# Patient Record
Sex: FEMALE | Race: WHITE | Employment: OTHER | URBAN - METROPOLITAN AREA
[De-identification: names, ages, dates, MRNs, and addresses within clinical notes are randomized per-mention and may not be internally consistent; named-entity substitution may affect disease eponyms.]

---

## 2018-02-16 ENCOUNTER — HOSPITAL ENCOUNTER (OUTPATIENT)
Age: 83
Discharge: HOME | End: 2018-02-16
Attending: EMERGENCY MEDICINE
Payer: MEDICARE

## 2018-02-16 VITALS
BODY MASS INDEX: 26.62 KG/M2 | RESPIRATION RATE: 20 BRPM | WEIGHT: 141 LBS | OXYGEN SATURATION: 100 % | TEMPERATURE: 98 F | HEART RATE: 91 BPM | DIASTOLIC BLOOD PRESSURE: 63 MMHG | SYSTOLIC BLOOD PRESSURE: 147 MMHG | HEIGHT: 61 IN

## 2018-02-16 DIAGNOSIS — T78.40XA ACUTE ALLERGIC REACTION, INITIAL ENCOUNTER: Primary | ICD-10-CM

## 2018-02-16 PROCEDURE — 99204 OFFICE O/P NEW MOD 45 MIN: CPT

## 2018-02-16 PROCEDURE — 99203 OFFICE O/P NEW LOW 30 MIN: CPT

## 2018-02-16 RX ORDER — ASPIRIN 81 MG/1
TABLET, CHEWABLE ORAL DAILY
COMMUNITY

## 2018-02-16 RX ORDER — LORATADINE 10 MG/1
10 TABLET ORAL DAILY
Qty: 30 TABLET | Refills: 0 | Status: SHIPPED | OUTPATIENT
Start: 2018-02-16 | End: 2018-03-18

## 2018-02-16 RX ORDER — PREDNISONE 20 MG/1
40 TABLET ORAL DAILY
Qty: 10 TABLET | Refills: 0 | Status: SHIPPED | OUTPATIENT
Start: 2018-02-16 | End: 2018-02-21

## 2018-02-16 NOTE — ED INITIAL ASSESSMENT (HPI)
1/9 pt received Tdap  1/12 dry mouth/ chapped area  1/25-1/30. Isaak Harmongwen White Went to [facility] for 3 days  2/15 symptoms started again  +itching  Redness to face   +swelling

## 2018-02-16 NOTE — ED PROVIDER NOTES
[facility]    History   Patient presents with:   Allergic Rxn Allergies (immune)    Stated Complaint: allergic reaction    HPI    The patient is an 73-year-old female with no significant past medical histo Exam    Constitutional: Well-developed well-nourished in no acute distress  Head: Normocephalic, no swelling or tenderness  Eyes: Nonicteric sclera, no conjunctival injection  ENT: TMs are clear and flat bilaterally.   There is no posterior pharyngeal eryth

## 2018-02-27 ENCOUNTER — HOSPITAL ENCOUNTER (OUTPATIENT)
Age: 83
Discharge: HOME | End: 2018-02-27
Attending: FAMILY MEDICINE
Payer: MEDICARE

## 2018-02-27 VITALS
DIASTOLIC BLOOD PRESSURE: 73 MMHG | HEART RATE: 94 BPM | BODY MASS INDEX: 26.5 KG/M2 | SYSTOLIC BLOOD PRESSURE: 144 MMHG | OXYGEN SATURATION: 98 % | TEMPERATURE: 98 F | HEIGHT: 62 IN | RESPIRATION RATE: 18 BRPM | WEIGHT: 144 LBS

## 2018-02-27 DIAGNOSIS — R21 RASH OF FACE: Primary | ICD-10-CM

## 2018-02-27 PROCEDURE — 99213 OFFICE O/P EST LOW 20 MIN: CPT

## 2018-02-27 PROCEDURE — 99214 OFFICE O/P EST MOD 30 MIN: CPT

## 2018-02-27 RX ORDER — METHYLPREDNISOLONE 4 MG/1
TABLET ORAL
Qty: 1 PACKAGE | Refills: 0 | Status: SHIPPED | OUTPATIENT
Start: 2018-02-27 | End: 2018-03-23

## 2018-02-27 NOTE — ED PROVIDER NOTES
Patient Seen in: [facility]    History   Patient presents with:  Rash Skin Problem (integumentary)    Stated Complaint: RASH    HPI    Patient is here with a facial rash. Started yesterday.   Notes redness and mild tingli normal. Pupils are equal, round, and reactive to light. No scleral icterus. Neck: Normal range of motion. Neck supple. No JVD present. No tracheal deviation present.    Cardiovascular: Normal rate, regular rhythm, normal heart sounds and intact distal pul [place] [id number]    If symptoms worsen        Medications Prescribed:  Discharge Medication List as of 2/27/2018 11:53 AM    START taking these medications    methylPREDNISolone 4 MG Oral Tablet Therapy Pack  Dosepack: use as

## 2018-02-27 NOTE — ED INITIAL ASSESSMENT (HPI)
PATIENT SEEN IN IC FOR EVALUATION OF FACIAL RASH. STATES HER SYMPTOMS STARTED IMPROVING THE NEXT DAY. PATIENT REPORTS RETURN OF FACIAL REDNESS AND ITCHINESS. PATIENT DOES NOT RECALL USE OF NEW DETERGENT/SOAPS/LOTIONS.   STATES SYMPTOMS STARTED WHEN SHE W

## 2018-03-05 PROCEDURE — 86003 ALLG SPEC IGE CRUDE XTRC EA: CPT | Performed by: ALLERGY & IMMUNOLOGY

## 2019-10-04 ENCOUNTER — HOSPITAL ENCOUNTER (OUTPATIENT)
Age: 84
Discharge: HOME | End: 2019-10-04
Attending: EMERGENCY MEDICINE
Payer: MEDICARE

## 2019-10-04 VITALS
RESPIRATION RATE: 20 BRPM | TEMPERATURE: 98 F | HEART RATE: 94 BPM | OXYGEN SATURATION: 97 % | DIASTOLIC BLOOD PRESSURE: 61 MMHG | SYSTOLIC BLOOD PRESSURE: 152 MMHG

## 2019-10-04 DIAGNOSIS — M54.32 SCIATICA OF LEFT SIDE: ICD-10-CM

## 2019-10-04 DIAGNOSIS — S39.012A STRAIN OF LUMBAR REGION, INITIAL ENCOUNTER: Primary | ICD-10-CM

## 2019-10-04 PROCEDURE — 99214 OFFICE O/P EST MOD 30 MIN: CPT

## 2019-10-04 PROCEDURE — 99213 OFFICE O/P EST LOW 20 MIN: CPT

## 2019-10-04 RX ORDER — METHYLPREDNISOLONE 4 MG/1
TABLET ORAL
Qty: 1 PACKAGE | Refills: 0 | Status: SHIPPED | OUTPATIENT
Start: 2019-10-04 | End: 2019-12-09

## 2019-10-04 NOTE — ED INITIAL ASSESSMENT (HPI)
Left lower back pain radiating into left buttock and down left leg starting yesterday after reaching over for mail. Some tingling sensation to LLE.

## 2019-10-04 NOTE — ED PROVIDER NOTES
Patient Seen in: [facility]      History   Patient presents with:  Back Pain (musculoskeletal)    Stated Complaint: LOWER BACK PAIN    HPI    The patient is an 51-year-old female with no significant past medical history p distress  Head: Normocephalic, no swelling or tenderness  Eyes: Nonicteric sclera, no conjunctival injection  ENT: TMs are clear and flat bilaterally.   There is no posterior pharyngeal erythema  Chest: Clear to auscultation, no tenderness  Cardiovascular:

## 2022-11-01 ENCOUNTER — EKG ENCOUNTER (OUTPATIENT)
Dept: LAB | Age: 87
End: 2022-11-01
Attending: FAMILY MEDICINE
Payer: MEDICARE

## 2022-11-01 ENCOUNTER — OFFICE VISIT (OUTPATIENT)
Dept: FAMILY MEDICINE CLINIC | Facility: CLINIC | Age: 87
End: 2022-11-01
Payer: MEDICARE

## 2022-11-01 ENCOUNTER — HOSPITAL ENCOUNTER (OUTPATIENT)
Dept: GENERAL RADIOLOGY | Age: 87
Discharge: HOME | End: 2022-11-01
Attending: FAMILY MEDICINE
Payer: MEDICARE

## 2022-11-01 ENCOUNTER — LAB ENCOUNTER (OUTPATIENT)
Dept: LAB | Age: 87
End: 2022-11-01
Attending: FAMILY MEDICINE
Payer: MEDICARE

## 2022-11-01 VITALS
DIASTOLIC BLOOD PRESSURE: 84 MMHG | HEART RATE: 83 BPM | SYSTOLIC BLOOD PRESSURE: 158 MMHG | WEIGHT: 173 LBS | HEIGHT: 60 IN | BODY MASS INDEX: 33.96 KG/M2 | RESPIRATION RATE: 16 BRPM

## 2022-11-01 DIAGNOSIS — M54.50 CHRONIC BILATERAL LOW BACK PAIN WITHOUT SCIATICA: ICD-10-CM

## 2022-11-01 DIAGNOSIS — Z13.29 SCREENING FOR THYROID DISORDER: ICD-10-CM

## 2022-11-01 DIAGNOSIS — Z12.31 ENCOUNTER FOR SCREENING MAMMOGRAM FOR MALIGNANT NEOPLASM OF BREAST: ICD-10-CM

## 2022-11-01 DIAGNOSIS — Z00.00 ENCOUNTER FOR ANNUAL HEALTH EXAMINATION: Primary | ICD-10-CM

## 2022-11-01 DIAGNOSIS — Z13.0 SCREENING, ANEMIA, DEFICIENCY, IRON: ICD-10-CM

## 2022-11-01 DIAGNOSIS — R03.0 ELEVATED BLOOD PRESSURE READING WITHOUT DIAGNOSIS OF HYPERTENSION: ICD-10-CM

## 2022-11-01 DIAGNOSIS — G89.29 CHRONIC BILATERAL LOW BACK PAIN WITHOUT SCIATICA: ICD-10-CM

## 2022-11-01 DIAGNOSIS — Z78.0 POSTMENOPAUSAL: ICD-10-CM

## 2022-11-01 DIAGNOSIS — R20.2 PARESTHESIA OF BOTH HANDS: ICD-10-CM

## 2022-11-01 DIAGNOSIS — R06.09 DYSPNEA ON EXERTION: ICD-10-CM

## 2022-11-01 DIAGNOSIS — Z13.6 SCREENING FOR CARDIOVASCULAR CONDITION: ICD-10-CM

## 2022-11-01 DIAGNOSIS — Z23 NEED FOR ZOSTER VACCINATION: ICD-10-CM

## 2022-11-01 DIAGNOSIS — Z23 NEED FOR VACCINATION: ICD-10-CM

## 2022-11-01 DIAGNOSIS — K21.9 GASTROESOPHAGEAL REFLUX DISEASE WITHOUT ESOPHAGITIS: ICD-10-CM

## 2022-11-01 PROBLEM — M54.32 SCIATIC PAIN, LEFT: Status: ACTIVE | Noted: 2022-11-01

## 2022-11-01 LAB
ALBUMIN SERPL-MCNC: 3.7 G/DL (ref 3.4–5)
ALBUMIN/GLOB SERPL: 0.8 {RATIO} (ref 1–2)
ALP LIVER SERPL-CCNC: 55 U/L
ALT SERPL-CCNC: 30 U/L
ANION GAP SERPL CALC-SCNC: 6 MMOL/L (ref 0–18)
AST SERPL-CCNC: 21 U/L (ref 15–37)
BASOPHILS # BLD AUTO: 0.09 X10(3) UL (ref 0–0.2)
BASOPHILS NFR BLD AUTO: 1 %
BILIRUB SERPL-MCNC: 0.7 MG/DL (ref 0.1–2)
BUN BLD-MCNC: 19 MG/DL (ref 7–18)
BUN/CREAT SERPL: 17.3 (ref 10–20)
CALCIUM BLD-MCNC: 9.9 MG/DL (ref 8.5–10.1)
CHLORIDE SERPL-SCNC: 106 MMOL/L (ref 98–112)
CHOLEST SERPL-MCNC: 154 MG/DL (ref ?–200)
CO2 SERPL-SCNC: 26 MMOL/L (ref 21–32)
CREAT BLD-MCNC: 1.1 MG/DL
DEPRECATED RDW RBC AUTO: 48.3 FL (ref 35.1–46.3)
EOSINOPHIL # BLD AUTO: 0.15 X10(3) UL (ref 0–0.7)
EOSINOPHIL NFR BLD AUTO: 1.6 %
ERYTHROCYTE [DISTWIDTH] IN BLOOD BY AUTOMATED COUNT: 12.7 % (ref 11–15)
FASTING PATIENT LIPID ANSWER: NO
FASTING STATUS PATIENT QL REPORTED: NO
FOLATE SERPL-MCNC: >20 NG/ML (ref 8.7–?)
GFR SERPLBLD BASED ON 1.73 SQ M-ARVRAT: 50 ML/MIN/1.73M2 (ref 60–?)
GLOBULIN PLAS-MCNC: 4.6 G/DL (ref 2.8–4.4)
GLUCOSE BLD-MCNC: 108 MG/DL (ref 70–99)
HCT VFR BLD AUTO: 45.8 %
HDLC SERPL-MCNC: 69 MG/DL (ref 40–59)
HGB BLD-MCNC: 14.3 G/DL
IMM GRANULOCYTES # BLD AUTO: 0.01 X10(3) UL (ref 0–1)
IMM GRANULOCYTES NFR BLD: 0.1 %
LDLC SERPL CALC-MCNC: 66 MG/DL (ref ?–100)
LYMPHOCYTES # BLD AUTO: 2.26 X10(3) UL (ref 1–4)
LYMPHOCYTES NFR BLD AUTO: 24.7 %
MCH RBC QN AUTO: 31.9 PG (ref 26–34)
MCHC RBC AUTO-ENTMCNC: 31.2 G/DL (ref 31–37)
MCV RBC AUTO: 102.2 FL
MONOCYTES # BLD AUTO: 0.91 X10(3) UL (ref 0.1–1)
MONOCYTES NFR BLD AUTO: 9.9 %
NEUTROPHILS # BLD AUTO: 5.74 X10 (3) UL (ref 1.5–7.7)
NEUTROPHILS # BLD AUTO: 5.74 X10(3) UL (ref 1.5–7.7)
NEUTROPHILS NFR BLD AUTO: 62.7 %
NONHDLC SERPL-MCNC: 85 MG/DL (ref ?–130)
OSMOLALITY SERPL CALC.SUM OF ELEC: 289 MOSM/KG (ref 275–295)
PLATELET # BLD AUTO: 240 10(3)UL (ref 150–450)
POTASSIUM SERPL-SCNC: 4 MMOL/L (ref 3.5–5.1)
PROT SERPL-MCNC: 8.3 G/DL (ref 6.4–8.2)
RBC # BLD AUTO: 4.48 X10(6)UL
SODIUM SERPL-SCNC: 138 MMOL/L (ref 136–145)
TRIGL SERPL-MCNC: 105 MG/DL (ref 30–149)
TSI SER-ACNC: 1.96 MIU/ML (ref 0.36–3.74)
VIT B12 SERPL-MCNC: 559 PG/ML (ref 193–986)
VLDLC SERPL CALC-MCNC: 16 MG/DL (ref 0–30)
WBC # BLD AUTO: 9.2 X10(3) UL (ref 4–11)

## 2022-11-01 PROCEDURE — 72050 X-RAY EXAM NECK SPINE 4/5VWS: CPT | Performed by: FAMILY MEDICINE

## 2022-11-01 PROCEDURE — 85025 COMPLETE CBC W/AUTO DIFF WBC: CPT

## 2022-11-01 PROCEDURE — G0009 ADMIN PNEUMOCOCCAL VACCINE: HCPCS | Performed by: FAMILY MEDICINE

## 2022-11-01 PROCEDURE — 93005 ELECTROCARDIOGRAM TRACING: CPT

## 2022-11-01 PROCEDURE — 90677 PCV20 VACCINE IM: CPT | Performed by: FAMILY MEDICINE

## 2022-11-01 PROCEDURE — 80061 LIPID PANEL: CPT

## 2022-11-01 PROCEDURE — 99204 OFFICE O/P NEW MOD 45 MIN: CPT | Performed by: FAMILY MEDICINE

## 2022-11-01 PROCEDURE — 36415 COLL VENOUS BLD VENIPUNCTURE: CPT

## 2022-11-01 PROCEDURE — 80053 COMPREHEN METABOLIC PANEL: CPT

## 2022-11-01 PROCEDURE — 93010 ELECTROCARDIOGRAM REPORT: CPT | Performed by: FAMILY MEDICINE

## 2022-11-01 PROCEDURE — 84443 ASSAY THYROID STIM HORMONE: CPT

## 2022-11-01 PROCEDURE — 72110 X-RAY EXAM L-2 SPINE 4/>VWS: CPT | Performed by: FAMILY MEDICINE

## 2022-11-01 PROCEDURE — 82607 VITAMIN B-12: CPT

## 2022-11-01 PROCEDURE — G0439 PPPS, SUBSEQ VISIT: HCPCS | Performed by: FAMILY MEDICINE

## 2022-11-01 PROCEDURE — 82746 ASSAY OF FOLIC ACID SERUM: CPT

## 2022-11-01 RX ORDER — VITS A,C,E/LUTEIN/MINERALS 300MCG-200
1 TABLET ORAL
COMMUNITY

## 2022-11-01 RX ORDER — OMEPRAZOLE 20 MG/1
20 CAPSULE, DELAYED RELEASE ORAL
Qty: 30 CAPSULE | Refills: 3 | Status: SHIPPED | OUTPATIENT
Start: 2022-11-01

## 2022-11-01 RX ORDER — ZOSTER VACCINE RECOMBINANT, ADJUVANTED 50 MCG/0.5
50 KIT INTRAMUSCULAR ONCE
Qty: 1 EACH | Refills: 1 | Status: SHIPPED | OUTPATIENT
Start: 2022-11-01 | End: 2022-11-01

## 2022-11-02 DIAGNOSIS — M47.816 SPONDYLOSIS OF LUMBAR REGION WITHOUT MYELOPATHY OR RADICULOPATHY: ICD-10-CM

## 2022-11-02 DIAGNOSIS — M47.812 OSTEOARTHRITIS OF CERVICAL SPINE, UNSPECIFIED SPINAL OSTEOARTHRITIS COMPLICATION STATUS: Primary | ICD-10-CM

## 2022-11-02 DIAGNOSIS — R73.09 ELEVATED GLUCOSE: ICD-10-CM

## 2022-11-10 ENCOUNTER — PATIENT MESSAGE (OUTPATIENT)
Dept: FAMILY MEDICINE CLINIC | Facility: CLINIC | Age: 87
End: 2022-11-10

## 2022-11-11 RX ORDER — LOSARTAN POTASSIUM 25 MG/1
25 TABLET ORAL DAILY
Qty: 30 TABLET | Refills: 0 | Status: SHIPPED | OUTPATIENT
Start: 2022-11-11

## 2022-11-11 NOTE — TELEPHONE ENCOUNTER
Dr Ivan Way =see below and advice,thanks. Future Appointments   Date Time Provider Nadege Delatorrei   11/16/2022  2:00 PM  [address]   11/30/2022  1:30 PM Ade Mayers MD PM&R [facility]   12/5/2022  2:40 PM L[facility] KOBY RM1 LMB KOBY EM [place]   12/8/2022  2:20 PM LMB DEXA RM1 LMB DEXA EM Abdias Marsh RN 11/10/2022  6:21 PM CST        ----- Message -----  From: Albert Thompson  Sent: 11/10/2022   4:36 PM CST  To: Em Rn Triage  Subject: blood pressure                                   Dr. Gabo Moreno told me to let her know if my blood pressure did not go down. it ranges from 142/88 to 163/97. I go for the stress test 11/16. Do you want me to do something before that? I will keep taking my blood pressure twice a day.

## 2022-11-16 ENCOUNTER — HOSPITAL ENCOUNTER (OUTPATIENT)
Dept: CV DIAGNOSTICS | Facility: HOSPITAL | Age: 87
Discharge: HOME | End: 2022-11-16
Attending: FAMILY MEDICINE
Payer: MEDICARE

## 2022-11-16 DIAGNOSIS — R06.09 DYSPNEA ON EXERTION: ICD-10-CM

## 2022-11-16 LAB
% OF MAX PREDICTED HR: 100 %
MAX DIASTOLIC BP: 70 MMHG
MAX HEART RATE: 137 BPM
MAX PREDICTED HEART RATE: 135 BPM
MAX SYSTOLIC BP: 200 MMHG
MAX WORK LOAD: 46

## 2022-11-16 PROCEDURE — 93350 STRESS TTE ONLY: CPT | Performed by: FAMILY MEDICINE

## 2022-11-16 PROCEDURE — 93018 CV STRESS TEST I&R ONLY: CPT | Performed by: INTERNAL MEDICINE

## 2022-11-16 PROCEDURE — 93016 CV STRESS TEST SUPVJ ONLY: CPT | Performed by: INTERNAL MEDICINE

## 2022-11-16 PROCEDURE — 93017 CV STRESS TEST TRACING ONLY: CPT | Performed by: FAMILY MEDICINE

## 2022-11-17 ENCOUNTER — PATIENT MESSAGE (OUTPATIENT)
Dept: FAMILY MEDICINE CLINIC | Facility: CLINIC | Age: 87
End: 2022-11-17

## 2022-11-17 DIAGNOSIS — R94.39 ABNORMAL STRESS TEST: Primary | ICD-10-CM

## 2022-11-17 RX ORDER — ASPIRIN 81 MG/1
81 TABLET ORAL DAILY
Qty: 30 TABLET | Refills: 1 | Status: SHIPPED | OUTPATIENT
Start: 2022-11-17

## 2022-11-17 RX ORDER — ATORVASTATIN CALCIUM 10 MG/1
10 TABLET, FILM COATED ORAL NIGHTLY
Qty: 90 TABLET | Refills: 3 | Status: SHIPPED | OUTPATIENT
Start: 2022-11-17

## 2022-11-30 ENCOUNTER — OFFICE VISIT (OUTPATIENT)
Dept: PHYSICAL MEDICINE AND REHAB | Facility: CLINIC | Age: 87
End: 2022-11-30
Payer: MEDICARE

## 2022-11-30 VITALS
SYSTOLIC BLOOD PRESSURE: 118 MMHG | BODY MASS INDEX: 31.91 KG/M2 | WEIGHT: 169 LBS | DIASTOLIC BLOOD PRESSURE: 84 MMHG | HEIGHT: 61 IN

## 2022-11-30 DIAGNOSIS — K21.9 GASTROESOPHAGEAL REFLUX DISEASE WITHOUT ESOPHAGITIS: ICD-10-CM

## 2022-11-30 DIAGNOSIS — M47.816 LUMBAR SPONDYLOSIS: Primary | ICD-10-CM

## 2022-11-30 PROCEDURE — 99204 OFFICE O/P NEW MOD 45 MIN: CPT | Performed by: PHYSICAL MEDICINE & REHABILITATION

## 2022-11-30 RX ORDER — METHYLPREDNISOLONE 4 MG/1
TABLET ORAL
Qty: 1 EACH | Refills: 0 | Status: SHIPPED | OUTPATIENT
Start: 2022-11-30

## 2022-12-01 ENCOUNTER — MED REC SCAN ONLY (OUTPATIENT)
Dept: PHYSICAL MEDICINE AND REHAB | Facility: CLINIC | Age: 87
End: 2022-12-01

## 2022-12-05 ENCOUNTER — HOSPITAL ENCOUNTER (OUTPATIENT)
Dept: MAMMOGRAPHY | Age: 87
Discharge: HOME | End: 2022-12-05
Attending: FAMILY MEDICINE
Payer: MEDICARE

## 2022-12-05 DIAGNOSIS — Z12.31 ENCOUNTER FOR SCREENING MAMMOGRAM FOR MALIGNANT NEOPLASM OF BREAST: ICD-10-CM

## 2022-12-05 PROCEDURE — 77067 SCR MAMMO BI INCL CAD: CPT | Performed by: FAMILY MEDICINE

## 2022-12-05 PROCEDURE — 77063 BREAST TOMOSYNTHESIS BI: CPT | Performed by: FAMILY MEDICINE

## 2022-12-13 ENCOUNTER — HOSPITAL ENCOUNTER (OUTPATIENT)
Dept: BONE DENSITY | Age: 87
Discharge: HOME | End: 2022-12-13
Attending: FAMILY MEDICINE
Payer: MEDICARE

## 2022-12-13 DIAGNOSIS — Z78.0 POSTMENOPAUSAL: ICD-10-CM

## 2022-12-13 PROCEDURE — 77080 DXA BONE DENSITY AXIAL: CPT | Performed by: FAMILY MEDICINE

## 2023-01-18 ENCOUNTER — MED REC SCAN ONLY (OUTPATIENT)
Dept: PHYSICAL MEDICINE AND REHAB | Facility: CLINIC | Age: 88
End: 2023-01-18

## 2023-02-27 ENCOUNTER — MED REC SCAN ONLY (OUTPATIENT)
Dept: PHYSICAL MEDICINE AND REHAB | Facility: CLINIC | Age: 88
End: 2023-02-27

## 2023-11-06 ENCOUNTER — OFFICE VISIT (OUTPATIENT)
Dept: FAMILY MEDICINE CLINIC | Facility: CLINIC | Age: 88
End: 2023-11-06
Payer: MEDICARE

## 2023-11-06 VITALS
HEIGHT: 61 IN | BODY MASS INDEX: 31.93 KG/M2 | WEIGHT: 169.13 LBS | SYSTOLIC BLOOD PRESSURE: 125 MMHG | RESPIRATION RATE: 17 BRPM | DIASTOLIC BLOOD PRESSURE: 75 MMHG | HEART RATE: 86 BPM

## 2023-11-06 DIAGNOSIS — I10 ESSENTIAL HYPERTENSION: ICD-10-CM

## 2023-11-06 DIAGNOSIS — Z00.00 ENCOUNTER FOR ANNUAL HEALTH EXAMINATION: Primary | ICD-10-CM

## 2023-11-06 DIAGNOSIS — G89.29 CHRONIC BILATERAL LOW BACK PAIN WITHOUT SCIATICA: ICD-10-CM

## 2023-11-06 DIAGNOSIS — E78.5 DYSLIPIDEMIA: ICD-10-CM

## 2023-11-06 DIAGNOSIS — Z23 NEEDS FLU SHOT: ICD-10-CM

## 2023-11-06 DIAGNOSIS — E55.9 VITAMIN D INSUFFICIENCY: ICD-10-CM

## 2023-11-06 DIAGNOSIS — M54.50 CHRONIC BILATERAL LOW BACK PAIN WITHOUT SCIATICA: ICD-10-CM

## 2023-11-16 ENCOUNTER — LAB ENCOUNTER (OUTPATIENT)
Dept: LAB | Age: 88
End: 2023-11-16
Attending: FAMILY MEDICINE
Payer: MEDICARE

## 2023-11-16 DIAGNOSIS — I10 ESSENTIAL HYPERTENSION: ICD-10-CM

## 2023-11-16 DIAGNOSIS — E78.5 DYSLIPIDEMIA: ICD-10-CM

## 2023-11-16 DIAGNOSIS — E55.9 VITAMIN D INSUFFICIENCY: ICD-10-CM

## 2023-11-16 LAB
ALBUMIN SERPL-MCNC: 4.3 G/DL (ref 3.2–4.8)
ALBUMIN/GLOB SERPL: 1.2 {RATIO} (ref 1–2)
ALP LIVER SERPL-CCNC: 57 U/L
ALT SERPL-CCNC: 15 U/L
ANION GAP SERPL CALC-SCNC: 10 MMOL/L (ref 0–18)
AST SERPL-CCNC: 18 U/L (ref ?–34)
BILIRUB SERPL-MCNC: 0.4 MG/DL (ref 0.2–1.1)
BUN BLD-MCNC: 18 MG/DL (ref 9–23)
BUN/CREAT SERPL: 17.8 (ref 10–20)
CALCIUM BLD-MCNC: 9.4 MG/DL (ref 8.7–10.4)
CHLORIDE SERPL-SCNC: 108 MMOL/L (ref 98–112)
CHOLEST SERPL-MCNC: 185 MG/DL (ref ?–200)
CO2 SERPL-SCNC: 25 MMOL/L (ref 21–32)
CREAT BLD-MCNC: 1.01 MG/DL
EGFRCR SERPLBLD CKD-EPI 2021: 54 ML/MIN/1.73M2 (ref 60–?)
FASTING PATIENT LIPID ANSWER: NO
FASTING STATUS PATIENT QL REPORTED: NO
GLOBULIN PLAS-MCNC: 3.5 G/DL (ref 2.8–4.4)
GLUCOSE BLD-MCNC: 146 MG/DL (ref 70–99)
HDLC SERPL-MCNC: 63 MG/DL (ref 40–59)
LDLC SERPL CALC-MCNC: 84 MG/DL (ref ?–100)
NONHDLC SERPL-MCNC: 122 MG/DL (ref ?–130)
OSMOLALITY SERPL CALC.SUM OF ELEC: 301 MOSM/KG (ref 275–295)
POTASSIUM SERPL-SCNC: 4.1 MMOL/L (ref 3.5–5.1)
PROT SERPL-MCNC: 7.8 G/DL (ref 5.7–8.2)
SODIUM SERPL-SCNC: 143 MMOL/L (ref 136–145)
TRIGL SERPL-MCNC: 230 MG/DL (ref 30–149)
VLDLC SERPL CALC-MCNC: 37 MG/DL (ref 0–30)

## 2023-11-16 PROCEDURE — 36415 COLL VENOUS BLD VENIPUNCTURE: CPT

## 2023-11-16 PROCEDURE — 82306 VITAMIN D 25 HYDROXY: CPT

## 2023-11-16 PROCEDURE — 80061 LIPID PANEL: CPT

## 2023-11-16 PROCEDURE — 80053 COMPREHEN METABOLIC PANEL: CPT

## 2023-11-17 DIAGNOSIS — R73.09 ELEVATED GLUCOSE: Primary | ICD-10-CM

## 2023-11-17 LAB — VIT D+METAB SERPL-MCNC: 42.5 NG/ML (ref 30–100)

## 2024-07-22 DIAGNOSIS — K21.9 GASTROESOPHAGEAL REFLUX DISEASE WITHOUT ESOPHAGITIS: ICD-10-CM

## 2024-07-25 RX ORDER — OMEPRAZOLE 20 MG/1
20 CAPSULE, DELAYED RELEASE ORAL
Qty: 90 CAPSULE | Refills: 0 | Status: SHIPPED | OUTPATIENT
Start: 2024-07-25

## 2024-07-25 NOTE — TELEPHONE ENCOUNTER
Giving 90 courtesy fill, needs to establish care with Dr. Johnson or other provider    Please call to make appointment     Refill passed per [facility] protocols.  Requested Prescriptions   Pending Prescriptions Disp Refills    OMEPRAZOLE 20 MG Oral Capsule Delayed Release [Pharmacy Med Name: Omeprazole Dr 20 Mg Cap Nort] 90 capsule 0     Sig: TAKE ONE CAPSULE BY MOUTH BEFORE BREAKFAST       Gastrointestional Medication Protocol Passed - 7/22/2024  3:44 PM        Passed - In person appointment or virtual visit in the past 12 mos or appointment in next 3 mos     Recent Outpatient Visits              8 months ago Encounter for annual health examination    [facility], [address], [place] Ariza Virginia Saxena MD    Office Visit    1 year ago Lumbar spondylosis    [facility], Evan Moody MD    Office Visit    1 year ago Encounter for annual health examination    [facility], [address], [place] Virginia Lima MD    Office Visit    4 years ago Acute left-sided low back pain with left-sided sciatica    Internal Medicine - [address], [place] Huertas-Rivera, Beatriz, MD    Office Visit    6 years ago Allergic contact dermatitis, unspecified trigger    Dermatology - [place] Kit Lee Ainah, MD    Office Visit

## 2024-10-21 ENCOUNTER — HOSPITAL ENCOUNTER (OUTPATIENT)
Dept: GENERAL RADIOLOGY | Age: 89
Discharge: HOME | End: 2024-10-21
Attending: INTERNAL MEDICINE
Payer: MEDICARE

## 2024-10-21 ENCOUNTER — OFFICE VISIT (OUTPATIENT)
Dept: INTERNAL MEDICINE CLINIC | Facility: CLINIC | Age: 89
End: 2024-10-21
Payer: MEDICARE

## 2024-10-21 VITALS
DIASTOLIC BLOOD PRESSURE: 81 MMHG | HEART RATE: 83 BPM | SYSTOLIC BLOOD PRESSURE: 131 MMHG | WEIGHT: 179 LBS | HEIGHT: 60.43 IN | RESPIRATION RATE: 18 BRPM | BODY MASS INDEX: 34.68 KG/M2

## 2024-10-21 DIAGNOSIS — G25.81 RESTLESS LEG SYNDROME: ICD-10-CM

## 2024-10-21 DIAGNOSIS — M25.551 PAIN OF RIGHT HIP: Primary | ICD-10-CM

## 2024-10-21 DIAGNOSIS — Z12.83 SKIN EXAM, SCREENING FOR CANCER: ICD-10-CM

## 2024-10-21 DIAGNOSIS — E61.1 IRON DEFICIENCY: ICD-10-CM

## 2024-10-21 DIAGNOSIS — M25.551 PAIN OF RIGHT HIP: ICD-10-CM

## 2024-10-21 DIAGNOSIS — K21.9 GASTROESOPHAGEAL REFLUX DISEASE WITHOUT ESOPHAGITIS: ICD-10-CM

## 2024-10-21 DIAGNOSIS — Z00.00 ANNUAL PHYSICAL EXAM: ICD-10-CM

## 2024-10-21 PROCEDURE — G0008 ADMIN INFLUENZA VIRUS VAC: HCPCS | Performed by: INTERNAL MEDICINE

## 2024-10-21 PROCEDURE — 90662 IIV NO PRSV INCREASED AG IM: CPT | Performed by: INTERNAL MEDICINE

## 2024-10-21 PROCEDURE — 73502 X-RAY EXAM HIP UNI 2-3 VIEWS: CPT | Performed by: INTERNAL MEDICINE

## 2024-10-21 PROCEDURE — 99214 OFFICE O/P EST MOD 30 MIN: CPT | Performed by: INTERNAL MEDICINE

## 2024-10-21 RX ORDER — LOSARTAN POTASSIUM 25 MG/1
25 TABLET ORAL DAILY
Qty: 90 TABLET | Refills: 3 | Status: SHIPPED | OUTPATIENT
Start: 2024-10-21

## 2024-10-21 NOTE — PROGRESS NOTES
Kendra Polanco is a 88 year old female.  Chief Complaint   Patient presents with    Establish Care     HPI:    Patient presented today for establishment of care. She states that she has been having pain in R hip mostly in the front and sides of the hip. Able to ambulate but with limitations.    Wants to see a derm for a full skin check, noticed a couple big nevus.          Current Outpatient Medications   Medication Sig Dispense Refill    losartan 25 MG Oral Tab Take 1 tablet (25 mg total) by mouth daily. 90 tablet 3    omeprazole 20 MG Oral Capsule Delayed Release Take 1 capsule (20 mg total) by mouth before breakfast. 90 capsule 3    multivitamin Oral Tab Take 1 tablet by mouth daily with breakfast.      Cholecalciferol (VITAMIN D) 2000 units Oral Cap Take 1 capsule (2,000 Units total) by mouth daily.      traMADol 50 MG Oral Tab Take 1 tablet (50 mg total) by mouth nightly as needed for Pain. 15 tablet 0      History reviewed. No pertinent past medical history.   Past Surgical History:   Procedure Laterality Date    Oophorectomy Bilateral     Cyst    Removal gallbladder      Total abdom hysterectomy      Fibromas      Social History:  Social History     Socioeconomic History    Marital status:    Tobacco Use    Smoking status: Former     Current packs/day: 0.00     Types: Cigarettes     Quit date:      Years since quittin.8    Smokeless tobacco: Never    Tobacco comments:     quit 2 yrs ago   Vaping Use    Vaping status: Never Used   Substance and Sexual Activity    Alcohol use: Yes     Alcohol/week: 0.0 standard drinks of alcohol     Comment: very rarely on social occassion    Drug use: No    Sexual activity: Never      History reviewed. No pertinent family history.   Allergies[1]     REVIEW OF SYSTEMS:   Review of Systems   Review of Systems   Constitutional: Negative for activity change, appetite change and fever.   HENT: Negative for congestion and voice change.    Respiratory: Negative for cough  and shortness of breath.    Cardiovascular: Negative for chest pain.   Gastrointestinal: Negative for abdominal distention, abdominal pain and vomiting.   Genitourinary: Negative for hematuria.   Skin: Negative for wound.   Psychiatric/Behavioral: Negative for behavioral problems.   Wt Readings from Last 5 Encounters:   10/21/24 179 lb (81.2 kg)   11/06/23 169 lb 1.6 oz (76.7 kg)   11/30/22 169 lb (76.7 kg)   11/01/22 173 lb (78.5 kg)   12/09/19 162 lb (73.5 kg)     Body mass index is 34.46 kg/m².      EXAM:   /81   Pulse 83   Resp 18   Ht 5' 0.43\" (1.535 m)   Wt 179 lb (81.2 kg)   BMI 34.46 kg/m²   Physical Exam   Constitutional:       Appearance: Normal appearance.   HENT:      Head: Normocephalic.   Eyes:      Conjunctiva/sclera: Conjunctivae normal.   Cardiovascular:      Rate and Rhythm: Normal rate and regular rhythm.      Heart sounds: Normal heart sounds. No murmur heard.  Pulmonary:      Effort: Pulmonary effort is normal.      Breath sounds: Normal breath sounds. No rhonchi or rales.   Abdominal:      General: Bowel sounds are normal.      Palpations: Abdomen is soft.      Tenderness: There is no abdominal tenderness.   Musculoskeletal:      Cervical back: Neck supple.      Right lower leg: No edema.      Left lower leg: No edema.   Skin:     General: Skin is warm and dry.   Neurological:      General: No focal deficit present.      Mental Status: He is alert and oriented to person, place, and time. Mental status is at baseline.   Psychiatric:         Mood and Affect: Mood normal.         Behavior: Behavior normal.       ASSESSMENT AND PLAN:   1. Pain of right hip  - tramadol as needed for pain   - avoid heavy lifting   - XR HIP W OR WO PELVIS 2 OR 3 VIEWS, RIGHT (CPT=73502) [[id number]] - Orthopedic providers only; Future    2. Skin exam, screening for cancer  - Derm Referral - In Network    3. Annual physical exam  - return for annual physical  - complete blood work prior  - Comp Metabolic Panel  (14); Future  - Lipid Panel; Future  - TSH W Reflex To Free T4; Future  - CBC With Differential With Platelet; Future    4. Restless leg syndrome  - check iron levels  - Iron And Tibc [E]; Future  - Ferritin [E]; Future    5. Iron deficiency  - Iron And Tibc [E]; Future  - Ferritin [E]; Future    6. Gastroesophageal reflux disease without esophagitis  - omeprazole 20 MG Oral Capsule Delayed Release; Take 1 capsule (20 mg total) by mouth before breakfast.  Dispense: 90 capsule; Refill: 3      The patient indicates understanding of these issues and agrees to the plan.  Return for physical     Zaira Johnson MD          [1]   Allergies  Allergen Reactions    Morphine NAUSEA AND VOMITING    Ink RASH

## 2024-10-23 ENCOUNTER — PATIENT MESSAGE (OUTPATIENT)
Dept: INTERNAL MEDICINE CLINIC | Facility: CLINIC | Age: 89
End: 2024-10-23

## 2024-10-23 DIAGNOSIS — M25.551 PAIN OF RIGHT HIP: Primary | ICD-10-CM

## 2024-10-24 RX ORDER — TRAMADOL HYDROCHLORIDE 50 MG/1
50 TABLET ORAL NIGHTLY PRN
Qty: 15 TABLET | Refills: 0 | Status: SHIPPED | OUTPATIENT
Start: 2024-10-24

## 2024-11-13 ENCOUNTER — LAB ENCOUNTER (OUTPATIENT)
Dept: LAB | Age: 89
End: 2024-11-13
Attending: INTERNAL MEDICINE
Payer: MEDICARE

## 2024-11-13 DIAGNOSIS — R73.09 ELEVATED GLUCOSE: ICD-10-CM

## 2024-11-13 DIAGNOSIS — G25.81 RESTLESS LEG SYNDROME: ICD-10-CM

## 2024-11-13 DIAGNOSIS — E61.1 IRON DEFICIENCY: ICD-10-CM

## 2024-11-13 DIAGNOSIS — Z00.00 ANNUAL PHYSICAL EXAM: ICD-10-CM

## 2024-11-13 LAB
ALBUMIN SERPL-MCNC: 4.6 G/DL (ref 3.2–4.8)
ALBUMIN/GLOB SERPL: 1.4 {RATIO} (ref 1–2)
ALP LIVER SERPL-CCNC: 66 U/L
ALT SERPL-CCNC: 18 U/L
ANION GAP SERPL CALC-SCNC: 9 MMOL/L (ref 0–18)
AST SERPL-CCNC: 20 U/L (ref ?–34)
BASOPHILS # BLD AUTO: 0.08 X10(3) UL (ref 0–0.2)
BASOPHILS NFR BLD AUTO: 1 %
BILIRUB SERPL-MCNC: 0.8 MG/DL (ref 0.2–1.1)
BUN BLD-MCNC: 19 MG/DL (ref 9–23)
BUN/CREAT SERPL: 17.6 (ref 10–20)
CALCIUM BLD-MCNC: 9.8 MG/DL (ref 8.7–10.4)
CHLORIDE SERPL-SCNC: 109 MMOL/L (ref 98–112)
CHOLEST SERPL-MCNC: 172 MG/DL (ref ?–200)
CO2 SERPL-SCNC: 26 MMOL/L (ref 21–32)
CREAT BLD-MCNC: 1.08 MG/DL
DEPRECATED HBV CORE AB SER IA-ACNC: 77 NG/ML
DEPRECATED RDW RBC AUTO: 46.1 FL (ref 35.1–46.3)
EGFRCR SERPLBLD CKD-EPI 2021: 50 ML/MIN/1.73M2 (ref 60–?)
EOSINOPHIL # BLD AUTO: 0.21 X10(3) UL (ref 0–0.7)
EOSINOPHIL NFR BLD AUTO: 2.7 %
ERYTHROCYTE [DISTWIDTH] IN BLOOD BY AUTOMATED COUNT: 12.5 % (ref 11–15)
EST. AVERAGE GLUCOSE BLD GHB EST-MCNC: 140 MG/DL (ref 68–126)
FASTING PATIENT LIPID ANSWER: YES
FASTING STATUS PATIENT QL REPORTED: YES
GLOBULIN PLAS-MCNC: 3.3 G/DL (ref 2–3.5)
GLUCOSE BLD-MCNC: 131 MG/DL (ref 70–99)
HBA1C MFR BLD: 6.5 % (ref ?–5.7)
HCT VFR BLD AUTO: 40.1 %
HDLC SERPL-MCNC: 58 MG/DL (ref 40–59)
HGB BLD-MCNC: 13 G/DL
IMM GRANULOCYTES # BLD AUTO: 0.02 X10(3) UL (ref 0–1)
IMM GRANULOCYTES NFR BLD: 0.3 %
IRON SATN MFR SERPL: 28 %
IRON SERPL-MCNC: 103 UG/DL
LDLC SERPL CALC-MCNC: 97 MG/DL (ref ?–100)
LYMPHOCYTES # BLD AUTO: 1.97 X10(3) UL (ref 1–4)
LYMPHOCYTES NFR BLD AUTO: 24.9 %
MCH RBC QN AUTO: 32.3 PG (ref 26–34)
MCHC RBC AUTO-ENTMCNC: 32.4 G/DL (ref 31–37)
MCV RBC AUTO: 99.5 FL
MONOCYTES # BLD AUTO: 0.61 X10(3) UL (ref 0.1–1)
MONOCYTES NFR BLD AUTO: 7.7 %
NEUTROPHILS # BLD AUTO: 5.03 X10 (3) UL (ref 1.5–7.7)
NEUTROPHILS # BLD AUTO: 5.03 X10(3) UL (ref 1.5–7.7)
NEUTROPHILS NFR BLD AUTO: 63.4 %
NONHDLC SERPL-MCNC: 114 MG/DL (ref ?–130)
OSMOLALITY SERPL CALC.SUM OF ELEC: 302 MOSM/KG (ref 275–295)
PLATELET # BLD AUTO: 264 10(3)UL (ref 150–450)
POTASSIUM SERPL-SCNC: 4.1 MMOL/L (ref 3.5–5.1)
PROT SERPL-MCNC: 7.9 G/DL (ref 5.7–8.2)
RBC # BLD AUTO: 4.03 X10(6)UL
SODIUM SERPL-SCNC: 144 MMOL/L (ref 136–145)
TIBC SERPL-MCNC: 364 UG/DL (ref 250–425)
TRANSFERRIN SERPL-MCNC: 244 MG/DL (ref 250–380)
TRIGL SERPL-MCNC: 91 MG/DL (ref 30–149)
TSI SER-ACNC: 1.73 UIU/ML (ref 0.55–4.78)
VLDLC SERPL CALC-MCNC: 15 MG/DL (ref 0–30)
WBC # BLD AUTO: 7.9 X10(3) UL (ref 4–11)

## 2024-11-13 PROCEDURE — 80053 COMPREHEN METABOLIC PANEL: CPT

## 2024-11-13 PROCEDURE — 83036 HEMOGLOBIN GLYCOSYLATED A1C: CPT

## 2024-11-13 PROCEDURE — 36415 COLL VENOUS BLD VENIPUNCTURE: CPT

## 2024-11-13 PROCEDURE — 85025 COMPLETE CBC W/AUTO DIFF WBC: CPT

## 2024-11-13 PROCEDURE — 83540 ASSAY OF IRON: CPT

## 2024-11-13 PROCEDURE — 84443 ASSAY THYROID STIM HORMONE: CPT

## 2024-11-13 PROCEDURE — 80061 LIPID PANEL: CPT

## 2024-11-13 PROCEDURE — 84466 ASSAY OF TRANSFERRIN: CPT

## 2024-11-13 PROCEDURE — 82728 ASSAY OF FERRITIN: CPT

## 2024-11-18 ENCOUNTER — OFFICE VISIT (OUTPATIENT)
Dept: INTERNAL MEDICINE CLINIC | Facility: CLINIC | Age: 89
End: 2024-11-18
Payer: MEDICARE

## 2024-11-18 VITALS
HEART RATE: 84 BPM | SYSTOLIC BLOOD PRESSURE: 121 MMHG | DIASTOLIC BLOOD PRESSURE: 74 MMHG | WEIGHT: 180 LBS | HEIGHT: 60.43 IN | BODY MASS INDEX: 34.88 KG/M2

## 2024-11-18 DIAGNOSIS — N18.31 STAGE 3A CHRONIC KIDNEY DISEASE (HCC): ICD-10-CM

## 2024-11-18 DIAGNOSIS — M16.11 PRIMARY OSTEOARTHRITIS OF RIGHT HIP: ICD-10-CM

## 2024-11-18 DIAGNOSIS — M54.50 ACUTE MIDLINE LOW BACK PAIN WITHOUT SCIATICA: ICD-10-CM

## 2024-11-18 DIAGNOSIS — Z00.00 ANNUAL PHYSICAL EXAM: Primary | ICD-10-CM

## 2024-11-18 DIAGNOSIS — R73.01 IMPAIRED FASTING BLOOD SUGAR: ICD-10-CM

## 2024-11-18 LAB
GLUCOSE BLOOD: 153
TEST STRIP LOT #: NORMAL NUMERIC

## 2024-11-18 PROCEDURE — G0439 PPPS, SUBSEQ VISIT: HCPCS | Performed by: INTERNAL MEDICINE

## 2024-11-18 PROCEDURE — 82947 ASSAY GLUCOSE BLOOD QUANT: CPT | Performed by: INTERNAL MEDICINE

## 2024-11-18 NOTE — PROGRESS NOTES
Subjective:   Kendra Polanco is a 89 year old female who presents for a Subsequent Annual Wellness visit (Pt already had Initial Annual Wellness) and scheduled follow up of multiple significant but stable problems.    Seen and examined. States that her restless leg is much better but she has been having the lower back pain that radiates to the R hip and front. X ray of the hip showed osteoarthritis. She does not want to take tramadol because of sedative properties. Has been taking ibuprofen or aleeve.   Blood work also discussed with her and her daughter. Hemogbin A1c is elevated    History/Other:   Fall Risk Assessment:   She has been screened for Falls and is High Risk. Fall Prevention information provided to patient in After Visit Summary.    Do you feel unsteady when standing or walking?: (Patient-Rptd) No  Do you worry about falling?: (Patient-Rptd) Yes  Have you fallen in the past year?: (Patient-Rptd) No     Cognitive Assessment:   She had a completely normal cognitive assessment - see flowsheet entries     Functional Ability/Status:   Kendra Polanco has some abnormal functions as listed below:  She has difficulties Shopping for Groceries based on screening of functional status. She has Vision problems based on screening of functional status. She has Walking problems based on screening of functional status. She has problems with Daily Activities based on screening of functional status.       Depression Screening (PHQ):  PHQ-2 SCORE: 0  , done 11/17/2024        5 minutes spent screening and counseling for depression    Advanced Directives:   She does NOT have a Living Will. [Do you have a living will?: (Patient-Rptd) No]  She does NOT have a Power of  for Health Care. [Do you have a healthcare power of ?: (Patient-Rptd) No]  Discussed Advance Care Planning with patient (and family/surrogate if present). Standard forms made available to patient in After Visit Summary.      Patient Active Problem List    Diagnosis    Vitamin D insufficiency    Esophageal reflux    Sciatic pain, left    Dyslipidemia    Impaired fasting blood sugar    Primary osteoarthritis of right hip    Acute midline low back pain without sciatica     Allergies:  She is allergic to morphine and ink.    Current Medications:  Outpatient Medications Marked as Taking for the 24 encounter (Office Visit) with Zaira Johnson MD   Medication Sig    metFORMIN 500 MG Oral Tab Take 0.5 tablets (250 mg total) by mouth daily with breakfast.    losartan 25 MG Oral Tab Take 1 tablet (25 mg total) by mouth daily.    omeprazole 20 MG Oral Capsule Delayed Release Take 1 capsule (20 mg total) by mouth before breakfast.    multivitamin Oral Tab Take 1 tablet by mouth daily with breakfast.    Cholecalciferol (VITAMIN D) 2000 units Oral Cap Take 1 capsule (2,000 Units total) by mouth daily.       Medical History:  She  has no past medical history on file.  Surgical History:  She  has a past surgical history that includes removal gallbladder; total abdom hysterectomy; and oophorectomy (Bilateral).   Family History:  Her family history is not on file.  Social History:  She  reports that she quit smoking about 4 years ago. Her smoking use included cigarettes. She has never used smokeless tobacco. She reports current alcohol use. She reports that she does not use drugs.    Tobacco:  She smoked tobacco in the past but quit greater than 12 months ago.  Social History     Tobacco Use   Smoking Status Former    Current packs/day: 0.00    Types: Cigarettes    Quit date:     Years since quittin.8   Smokeless Tobacco Never   Tobacco Comments    quit 2 yrs ago        CAGE Alcohol Screen:   CAGE screening score of 0 on 2024, showing low risk of alcohol abuse.      Patient Care Team:  Virginia Lima MD as PCP - General (Family Medicine)    Review of Systems  GENERAL: feels well otherwise  SKIN: denies any unusual skin lesions  EYES: denies  blurred vision or double vision  HEENT: denies nasal congestion, sinus pain or ST  LUNGS: denies shortness of breath with exertion  CARDIOVASCULAR: denies chest pain on exertion  GI: denies abdominal pain, denies heartburn  : denies dysuria, vaginal discharge or itching, no complaint of urinary incontinence   MUSCULOSKELETAL: denies back pain  NEURO: denies headaches  PSYCHE: denies depression or anxiety  HEMATOLOGIC: denies hx of anemia  ENDOCRINE: denies thyroid history  ALL/ASTHMA: denies hx of allergy or asthma    Objective:   Physical Exam  General Appearance:  Alert, cooperative, no distress, appears stated age   Head:  Normocephalic, without obvious abnormality, atraumatic   Eyes:  PERRL, conjunctiva/corneas clear, EOM's intact both eyes   Ears:  Normal TM's and external ear canals, both ears   Nose: Nares normal, septum midline,mucosa normal, no drainage or sinus tenderness   Throat: Lips, mucosa, and tongue normal; teeth and gums normal   Neck: Supple, symmetrical, trachea midline, no adenopathy;  thyroid: not enlarged, symmetric, no tenderness/mass/nodules; no carotid bruit or JVD   Back:   Symmetric, no curvature, ROM normal, no CVA tenderness   Lungs:   Clear to auscultation bilaterally, respirations unlabored   Heart:  Regular rate and rhythm, S1 and S2 normal, no murmur, rub, or gallop   Abdomen:   Soft, non-tender, bowel sounds active all four quadrants,  no masses, no organomegaly   Pelvic: Deferred   Extremities: Extremities normal, atraumatic, no cyanosis or edema   Pulses: 2+ and symmetric   Skin: Skin color, texture, turgor normal, no rashes or lesions   Lymph nodes: Cervical, supraclavicular, and axillary nodes normal   Neurologic: Normal       /74   Pulse 84   Ht 5' 0.43\" (1.535 m)   Wt 180 lb (81.6 kg)   BMI 34.66 kg/m²  Estimated body mass index is 34.66 kg/m² as calculated from the following:    Height as of this encounter: 5' 0.43\" (1.535 m).    Weight as of this encounter: 180  lb (81.6 kg).    Medicare Hearing Assessment:   Hearing Screening    Time taken: 11/18/2024  3:45 PM  Screening Method: Finger Rub  Finger Rub Result: Pass         Visual Acuity:   Right Eye Visual Acuity: Corrected Right Eye Chart Acuity: 20/40   Left Eye Visual Acuity: Corrected Left Eye Chart Acuity: 20/40   Both Eyes Visual Acuity: Corrected Both Eyes Chart Acuity: 20/40            Assessment & Plan:   Kendra Polanco is a 89 year old female who presents for a Medicare Assessment.     1. Annual physical exam (Primary)  - annual labs discussed with the patient and daughter  - immunizations reviewed  - general diet and exercise counseling    2. Primary osteoarthritis of right hip  - start physical therapy. If no improvement will consider ortho referral   -     PHYSICAL THERAPY - INTERNAL  3. Acute midline low back pain without sciatica  -     PHYSICAL THERAPY - INTERNAL  - likely secondary to DJD  4. Impaired fasting blood sugar  - detailed discussion regarding life style modification  - will start her on a small dose of metformin  - repeat [id number] in 3 months  -     metFORMIN HCl; Take 0.5 tablets (250 mg total) by mouth daily with breakfast.  Dispense: 60 tablet; Refill: 0  -     Basic Metabolic Panel (8); Future; Expected date: 11/18/2024  -     POC HemoCue Glucose 201 (Finger stick glucose)  5- CKD stage IIIa  - avoid nsaids  - repeat blood work in 3 months    The patient indicates understanding of these issues and agrees to the plan.  Reinforced healthy diet, lifestyle, and exercise.      No follow-ups on file.     Zaira Johnson MD, 11/20/2024     Supplementary Documentation:   General Health:  In the past six months, have you lost more than 10 pounds without trying?: 2 - No  Has your appetite been poor?: (Patient-Rptd) No  Type of Diet: (Patient-Rptd) Low Salt  How does the patient maintain a good energy level?: (Patient-Rptd) Stretching  How would you describe your daily physical activity?:  (Patient-Rptd) Light  How would you describe your current health state?: (Patient-Rptd) Fair  How do you maintain positive mental well-being?: (Patient-Rptd) Puzzles;Visiting Friends;Visiting Family  On a scale of 0 to 10, with 0 being no pain and 10 being severe pain, what is your pain level?: 3 - (Mild)  In the past six months, have you experienced urine leakage?: (Patient-Rptd) 0-No  At any time do you feel concerned for the safety/well-being of yourself and/or your children, in your home or elsewhere?: (Patient-Rptd) No  Have you had any immunizations at another office such as Influenza, Hepatitis B, Tetanus, or Pneumococcal?: (Patient-Rptd) No    Health Maintenance   Topic Date Due    COVID-19 Vaccine (7 - 2024-25 season) 09/01/2024    Annual Physical  11/06/2024    Influenza Vaccine  Completed    Annual Depression Screening  Completed    Fall Risk Screening (Annual)  Completed    Pneumococcal Vaccine: 65+ Years  Completed    Zoster Vaccines  Completed

## 2024-11-20 PROBLEM — M54.50 ACUTE MIDLINE LOW BACK PAIN WITHOUT SCIATICA: Status: ACTIVE | Noted: 2024-11-20

## 2024-11-20 PROBLEM — R73.01 IMPAIRED FASTING BLOOD SUGAR: Status: ACTIVE | Noted: 2024-11-20

## 2024-11-20 PROBLEM — M16.11 PRIMARY OSTEOARTHRITIS OF RIGHT HIP: Status: ACTIVE | Noted: 2024-11-20

## 2024-12-04 ENCOUNTER — OFFICE VISIT (OUTPATIENT)
Dept: PHYSICAL THERAPY | Age: 89
End: 2024-12-04
Payer: MEDICARE

## 2024-12-04 DIAGNOSIS — M54.50 ACUTE MIDLINE LOW BACK PAIN WITHOUT SCIATICA: ICD-10-CM

## 2024-12-04 DIAGNOSIS — M16.11 PRIMARY OSTEOARTHRITIS OF RIGHT HIP: Primary | ICD-10-CM

## 2024-12-04 PROCEDURE — 97161 PT EVAL LOW COMPLEX 20 MIN: CPT | Performed by: PHYSICAL THERAPIST

## 2024-12-04 PROCEDURE — 97110 THERAPEUTIC EXERCISES: CPT | Performed by: PHYSICAL THERAPIST

## 2024-12-04 NOTE — PROGRESS NOTES
SPINE EVALUATION:     Diagnosis:     Primary osteoarthritis of right hip (M16.11)  Acute midline low back pain without sciatica (M54.50)      Referring Provider: Zaira Johnson  Date of Evaluation:    12/4/2024    Precautions:  None Next MD visit:   none scheduled  Date of Surgery: n/a     PATIENT SUMMARY   Kendra Polanco is a 89 year old female who presents to therapy today with complaints of R Back pain and R hip pain. Back pain started in 2019. Right hip started about 6 months. Patient has had PT for her back a couple of times. Pt denies sciatica pain now. Patient has R sided LBP with R hip pain going down to posterior mid thigh. Patient only needs medication when she is increasing activity. If she is resting, she does not need medication. She is starting to now have pain with sitting and laying. Pt is unable to sleep on the right side. Pain is described as sharp pain. Patient will have a shooting pain when stepping up onto stairs leading with the R. Pt uses a step to pattern with stairs. Pt is unable to stand for greater than 5 minutes.       Pt describes pain level current 5/10, at best 5/10, at worst 10/10.   Current functional limitations include walking, sitting, standing, stairs.     Kendra describes prior level of function Independent with ADLs without pain.   Past medical history was reviewed with Kendra. Significant findings include LBP with sciatica  Pt denies diplopia, dysarthria, dysphasia, dizziness, drop attacks, bowel/bladder changes, saddle anesthesia, and VELIA LE N/T.    ASSESSMENT  Kendra presents to physical therapy evaluation with primary c/o R hip pain. The results of the objective tests and measures show decreased R hip AROM, decreased strength, impaired gait and balance.  Functional deficits include but are not limited to pain with sitting, standing, walking, laying down.  Signs and symptoms are consistent with diagnosis of Primary osteoarthritis of right hip (M16.11)  Acute midline low back  pain without sciatica (M54.50)   . Pt and PT discussed evaluation findings, pathology, POC and HEP.  Pt voiced understanding and performs HEP correctly without reported pain. Skilled Physical Therapy is medically necessary to address the above impairments and reach functional goals.     OBJECTIVE:   Observation/Posture: Pt sits with forward flexed posture  Neuro Screen: na    R hip AROM  Flex  IR 15 deg with pain  ER 5 degrees  Ext: 30 degrees    Accessory motion: NT  Palpation: no tenderness noted    Strength: (* denotes performed with pain)  LE   Hip flexion (L2): R 3+/5; L 4/5  Hip abduction: R 3+/5; L 4/5  Hip Extension: R 3+/5; L 4/5   Hip ER: R 3-/5; L 4/5  Hip IR: R 3-/5; L 4/5  Knee Flexion: R 5/5; L 5/5   Knee extension (L3): R 5/5; L 5/5        Flexibility:   LE   Hip Flexor: R Mod limitations, L Mod limitations  Hamstrings: R WNL; L WNL  Quads: R Mod limitations; L Mod limitations       Special tests:   NA    Gait: pt ambulates on level ground with assistive device of small based quad cane .  Balance: SLS R 2 seconds, L >10 seconds    Today’s Treatment and Response:   Pt education was provided on exam findings, treatment diagnosis, treatment plan, expectations, and prognosis. Pt was also provided recommendations for  HEP  Patient was instructed in and issued a HEP for:   Exercises  - Hooklying Gluteal Sets  - 1 x daily - 7 x weekly - 1 sets - 15 reps - 5 hold  - Supine Heel Slide  - 1 x daily - 7 x weekly - 1 sets - 10 reps  - Clamshell  - 1 x daily - 7 x weekly - 1 sets - 10 reps  - Supine Lower Trunk Rotation  - 1 x daily - 7 x weekly - 1 sets - 10 reps    Charges: PT Eval Low Complexity, 1 TherEx x15min      Total Timed Treatment: 15 min     Total Treatment Time: 45 min     Based on clinical rationale and outcome measures, this evaluation involved Low Complexity decision making due to 1-2 personal factors/comorbidities  PLAN OF CARE:    Goals: (to be met in 10 visits)   Patient to be independent with  HEP  Patient to increase R hip strength=L to allow patient to ascend descend stairs comfortably at home  Patient to increase R hip AROM=WFL to allow patient to roll in bed without increases in pain  Patient to be able to stand for 15 minutes without c/o increases in pain >1/10  Patient to be able to walk for >15 minutes without c/o increases in pain.     Frequency / Duration: Patient will be seen for 2 x/week or a total of 10 visits over a 90 day period. Treatment will include: Gait training, Manual Therapy, Neuromuscular Re-education, Therapeutic Activities, Therapeutic Exercise, Home Exercise Program instruction, and Modalities to include: Cold packs    Education or treatment limitation: None  Rehab Potential:good    LEFS Score  No data recorded    Patient/Family/Caregiver was advised of these findings, precautions, and treatment options and has agreed to actively participate in planning and for this course of care.    Thank you for your referral. Please co-sign or sign and return this letter via fax as soon as possible to [phone]. If you have any questions, please contact me at Dept: [phone]    Sincerely,  Electronically signed by therapist: Mayra Chamberlain PT    Physician's certification required: Yes  I certify the need for these services furnished under this plan of treatment and while under my care.    X___________________________________________________ Date____________________    Certification From: 12/4/2024  To:3/4/2025

## 2024-12-10 ENCOUNTER — OFFICE VISIT (OUTPATIENT)
Dept: PHYSICAL THERAPY | Age: 89
End: 2024-12-10
Payer: MEDICARE

## 2024-12-10 PROCEDURE — 97140 MANUAL THERAPY 1/> REGIONS: CPT | Performed by: PHYSICAL THERAPIST

## 2024-12-10 PROCEDURE — 97110 THERAPEUTIC EXERCISES: CPT | Performed by: PHYSICAL THERAPIST

## 2024-12-10 NOTE — PROGRESS NOTES
Diagnosis:   Primary osteoarthritis of right hip (M16.11)  Acute midline low back pain without sciatica (M54.50)         Referring Provider: No ref. provider found  Date of Evaluation:    12/4/24    Precautions:  None Next MD visit:   none scheduled  Date of Surgery: n/a   Insurance Primary/Secondary: MEDICARE / COMMERCIAL     # Auth Visits: 2/10            Subjective: Pt had pain last night and didn't sleep well. Patient states that the R knee has been starting to give out on her that started about 2 months.     Pain: 8/10      Objective: See table below      Assessment: Patient had some discomfort with passive IR/ER with the R hip. Pt attempted second set of 5 for bridges , but cramped on the left side. Standing on the airex increased pain. Pt felt a little dizzy after standing exercises but felt better after drinking water. She thinks it may be because she didnt have much water today after coffee. Patient felt fine and denied dizziness after having water- about less than a minute recovery.      Goals:   Patient to be independent with HEP  Patient to increase R hip strength=L to allow patient to ascend descend stairs comfortably at home  Patient to increase R hip AROM=WFL to allow patient to roll in bed without increases in pain  Patient to be able to stand for 15 minutes without c/o increases in pain >1/10  Patient to be able to walk for >15 minutes without c/o increases in pain.    Plan: Cont POC  Date: 12/10/2024  TX#: 2/10 Date:                 TX#: 3/ Date:                 TX#: 4/ Date:                 TX#: 5/ Date:   Tx#: 6/   Manual:  PROM hip all planes       TherEx:  SAQ 2x10  Bridge x5, x2  LAQ x15B  Stand on airex:  Hip abd x10 B - dc after about 8 reps on the L because increased R hip pain  Standing on firm surface:  Hip ext B x10  Calf stretch on incline 30 sec x2                         HEP:   Eval:  Exercises  - Hooklying Gluteal Sets  - 1 x daily - 7 x weekly - 1 sets - 15 reps - 5 hold  - Supine  Heel Slide  - 1 x daily - 7 x weekly - 1 sets - 10 reps  - Clamshell  - 1 x daily - 7 x weekly - 1 sets - 10 reps  - Supine Lower Trunk Rotation  - 1 x daily - 7 x weekly - 1 sets - 10 reps    Charges: 1 Manual x10min, 2 TherEx x35min       Total Timed Treatment: 45min  Total Treatment Time: 45 min

## 2024-12-12 ENCOUNTER — TELEPHONE (OUTPATIENT)
Dept: PHYSICAL THERAPY | Facility: HOSPITAL | Age: 89
End: 2024-12-12

## 2024-12-17 ENCOUNTER — OFFICE VISIT (OUTPATIENT)
Dept: PHYSICAL THERAPY | Age: 89
End: 2024-12-17
Payer: MEDICARE

## 2024-12-17 PROCEDURE — 97140 MANUAL THERAPY 1/> REGIONS: CPT | Performed by: PHYSICAL THERAPIST

## 2024-12-17 PROCEDURE — 97110 THERAPEUTIC EXERCISES: CPT | Performed by: PHYSICAL THERAPIST

## 2024-12-17 NOTE — PROGRESS NOTES
Diagnosis:   Primary osteoarthritis of right hip (M16.11)  Acute midline low back pain without sciatica (M54.50)         Referring Provider: No ref. provider found  Date of Evaluation:    12/4/24    Precautions:  None Next MD visit:   none scheduled  Date of Surgery: n/a   Insurance Primary/Secondary: MEDICARE / COMMERCIAL     # Auth Visits: 2/10            Subjective: Pt doing okay. Pain has increased due to weather changes possibly per patient.    Pain: 7/10      Objective: See table below      Assessment: Patient had some discomfort with passive IR/ER with the R hip, but was able to increase in ROM with gentle PROM. Worked on quad strength to help increase stability of the R knee to help decrease feelings of \"giving out\" for the R knee. When standing on the R LE, patient had pain along the R hamstring.       Goals:   Patient to be independent with HEP  Patient to increase R hip strength=L to allow patient to ascend descend stairs comfortably at home  Patient to increase R hip AROM=WFL to allow patient to roll in bed without increases in pain  Patient to be able to stand for 15 minutes without c/o increases in pain >1/10  Patient to be able to walk for >15 minutes without c/o increases in pain.    Plan: Cont POC  Date: 12/10/2024  TX#: 2/10 Date: 12/17/24                TX#: 3/10 Date:                 TX#: 4/ Date:                 TX#: 5/ Date:   Tx#: 6/   Manual:  PROM hip all planes Manual:  PROM hip all planes  Gentle STM over clothing lateral hip  Prone quad stretch x 1 min B      TherEx:  SAQ 2x10  Bridge x5, x2  LAQ x15B  Stand on airex:  Hip abd x10 B - dc after about 8 reps on the L because increased R hip pain  Standing on firm surface:  Hip ext B x10  Calf stretch on incline 30 sec x2   TherEx:  SL clams 2x10 B  Prone knee flexion 2x10 B  LAQ 2# 3x10 B  March 2# 2x10B  Hip Abd to tolerance 2 # x10 B                       HEP:   Eval:  Exercises  - Hooklying Gluteal Sets  - 1 x daily - 7 x weekly - 1 sets  - 15 reps - 5 hold  - Supine Heel Slide  - 1 x daily - 7 x weekly - 1 sets - 10 reps  - Clamshell  - 1 x daily - 7 x weekly - 1 sets - 10 reps  - Supine Lower Trunk Rotation  - 1 x daily - 7 x weekly - 1 sets - 10 reps    Charges: 1 Manual x10min, 2 TherEx x35min       Total Timed Treatment: 45min  Total Treatment Time: 45 min

## 2025-01-07 ENCOUNTER — OFFICE VISIT (OUTPATIENT)
Dept: PHYSICAL THERAPY | Age: 89
End: 2025-01-07
Payer: MEDICARE

## 2025-01-07 PROCEDURE — 97110 THERAPEUTIC EXERCISES: CPT | Performed by: PHYSICAL THERAPIST

## 2025-01-07 NOTE — PROGRESS NOTES
Diagnosis:   Primary osteoarthritis of right hip (M16.11)  Acute midline low back pain without sciatica (M54.50)         Referring Provider: No ref. provider found  Date of Evaluation:    12/4/24    Precautions:  None Next MD visit:   none scheduled  Date of Surgery: n/a   Insurance Primary/Secondary: MEDICARE / COMMERCIAL     # Auth Visits: 2/10            Subjective: Pt had pain this morning when getting out of bed on her R quad. Less of the lateral hip pain like before.     Pain: 10/10- R quad      Objective: See table below      Assessment: Pt had pain with attempted SLR. Able to perform LAQ and SAQ with light weight. No c/o pain with weights. Pt had pain with passive hip abd. Will issue updated hep next visit.       Goals:   Patient to be independent with HEP  Patient to increase R hip strength=L to allow patient to ascend descend stairs comfortably at home  Patient to increase R hip AROM=WFL to allow patient to roll in bed without increases in pain  Patient to be able to stand for 15 minutes without c/o increases in pain >1/10  Patient to be able to walk for >15 minutes without c/o increases in pain.    Plan: Cont POC  Date: 12/10/2024  TX#: 2/10 Date: 12/17/24                TX#: 3/10 Date:   1/7/24              TX#: 4/10 Date:                 TX#: 5/ Date:   Tx#: 6/   Manual:  PROM hip all planes Manual:  PROM hip all planes  Gentle STM over clothing lateral hip  Prone quad stretch x 1 min B Manual:  PROM hip all planes  Gentle STM over clothing lateral hip  Prone quad stretch x 1 min B     TherEx:  SAQ 2x10  Bridge x5, x2  LAQ x15B  Stand on airex:  Hip abd x10 B - dc after about 8 reps on the L because increased R hip pain  Standing on firm surface:  Hip ext B x10  Calf stretch on incline 30 sec x2   TherEx:  SL clams 2x10 B  Prone knee flexion 2x10 B  LAQ 2# 3x10 B  March 2# 2x10B  Hip Abd to tolerance 2 # x10 B    TherEx:   Bent knee fall out 10 sec hold x5 B   Hooklying ball squeeze 5 sec hold x15  SAQ  1# x15 B  Attempted SLR- DC due to pain  Prone HS curl 1# x20  LAQ 1# x15 B  Prone quad stretch manual x1-2 min B                    HEP:   Eval:  Exercises  - Hooklying Gluteal Sets  - 1 x daily - 7 x weekly - 1 sets - 15 reps - 5 hold  - Supine Heel Slide  - 1 x daily - 7 x weekly - 1 sets - 10 reps  - Clamshell  - 1 x daily - 7 x weekly - 1 sets - 10 reps  - Supine Lower Trunk Rotation  - 1 x daily - 7 x weekly - 1 sets - 10 reps    Charges: 1 Manual x10min, 2 TherEx x35min       Total Timed Treatment: 45min  Total Treatment Time: 45 min

## 2025-01-09 ENCOUNTER — OFFICE VISIT (OUTPATIENT)
Dept: PHYSICAL THERAPY | Age: 89
End: 2025-01-09
Payer: MEDICARE

## 2025-01-09 PROCEDURE — 97110 THERAPEUTIC EXERCISES: CPT | Performed by: PHYSICAL THERAPIST

## 2025-01-09 NOTE — PROGRESS NOTES
Diagnosis:   Primary osteoarthritis of right hip (M16.11)  Acute midline low back pain without sciatica (M54.50)         Referring Provider: No ref. provider found  Date of Evaluation:    12/4/24    Precautions:  None Next MD visit:   none scheduled  Date of Surgery: n/a   Insurance Primary/Secondary: MEDICARE / COMMERCIAL     # Auth Visits: 2/10            Subjective: Pt had pain this morning when getting out of bed on her R quad. Less of the lateral hip pain like before.     Pain: 10/10- R quad      Objective: See table below      Assessment: Pt showed improved hip rom with standing exercises today. Was able to tolerate progression in difficulty of exercises well. Slight pain with hip abd in standing. Also, showed good endurance with NuStep.       Goals:   Patient to be independent with HEP  Patient to increase R hip strength=L to allow patient to ascend descend stairs comfortably at home  Patient to increase R hip AROM=WFL to allow patient to roll in bed without increases in pain  Patient to be able to stand for 15 minutes without c/o increases in pain >1/10  Patient to be able to walk for >15 minutes without c/o increases in pain.    Plan: Cont POC  Date: 12/10/2024  TX#: 2/10 Date: 12/17/24                TX#: 3/10 Date:   1/7/24              TX#: 4/10 Date: 1/9/25                TX#: 5/10 Date:   Tx#: 6/   Manual:  PROM hip all planes Manual:  PROM hip all planes  Gentle STM over clothing lateral hip  Prone quad stretch x 1 min B Manual:  PROM hip all planes  Gentle STM over clothing lateral hip  Prone quad stretch x 1 min B TherEx:  NuStep x7 min  Standing march 2x10  Standing 2# abd 2x10  Standing 2# ext 2x10  Standing HS curl 2# 2x10  Sitting LAQ 2# 3x10 B  Seated IR ER 2x10  QS long sitting x20  Review HEP    TherEx:  SAQ 2x10  Bridge x5, x2  LAQ x15B  Stand on airex:  Hip abd x10 B - dc after about 8 reps on the L because increased R hip pain  Standing on firm surface:  Hip ext B x10  Calf stretch on  incline 30 sec x2   TherEx:  SL clams 2x10 B  Prone knee flexion 2x10 B  LAQ 2# 3x10 B  March 2# 2x10B  Hip Abd to tolerance 2 # x10 B    TherEx:   Bent knee fall out 10 sec hold x5 B   Hooklying ball squeeze 5 sec hold x15  SAQ 1# x15 B  Attempted SLR- DC due to pain  Prone HS curl 1# x20  LAQ 1# x15 B  Prone quad stretch manual x1-2 min B                    HEP:   Eval:  Exercises  - Hooklying Gluteal Sets  - 1 x daily - 7 x weekly - 1 sets - 15 reps - 5 hold  - Supine Heel Slide  - 1 x daily - 7 x weekly - 1 sets - 10 reps  - Clamshell  - 1 x daily - 7 x weekly - 1 sets - 10 reps  - Supine Lower Trunk Rotation  - 1 x daily - 7 x weekly - 1 sets - 10 reps    1/9/25  Access Code: [email or web address]  URL: [email or web address]  Date: 01/09/2025  Prepared by: Mayra Chamberlain    Exercises  - Supine Bridge  - 1 x daily - 7 x weekly - 2 sets - 10 reps  - Bent Knee Fallouts  - 1 x daily - 7 x weekly - 1 sets - 10 reps  - Clamshell  - 1 x daily - 7 x weekly - 3 sets - 10 reps  - Supine Lower Trunk Rotation  - 1 x daily - 7 x weekly - 3 sets - 10 reps  - Standing Hip Abduction with Counter Support  - 1 x daily - 7 x weekly - 1 sets - 10 reps  - Standing March with Counter Support  - 1 x daily - 7 x weekly - 2 sets - 10 reps  - Long Sitting Quad Set  - 1 x daily - 7 x weekly - 2 sets - 10 reps  - Seated Long Arc Quad  - 1 x daily - 7 x weekly - 3 sets - 10 reps    Charges: 3 TherEx 45min       Total Timed Treatment: 45min  Total Treatment Time: 45 min

## 2025-01-16 ENCOUNTER — APPOINTMENT (OUTPATIENT)
Dept: PHYSICAL THERAPY | Age: 89
End: 2025-01-16
Payer: MEDICARE

## 2025-01-16 ENCOUNTER — OFFICE VISIT (OUTPATIENT)
Dept: PHYSICAL THERAPY | Age: 89
End: 2025-01-16
Payer: MEDICARE

## 2025-01-16 PROCEDURE — 97110 THERAPEUTIC EXERCISES: CPT | Performed by: PHYSICAL THERAPIST

## 2025-01-16 NOTE — PROGRESS NOTES
Diagnosis:   Primary osteoarthritis of right hip (M16.11)  Acute midline low back pain without sciatica (M54.50)         Referring Provider: No ref. provider found  Date of Evaluation:    12/4/24    Precautions:  None Next MD visit:   none scheduled  Date of Surgery: n/a   Insurance Primary/Secondary: MEDICARE / COMMERCIAL     # Auth Visits: 2/10            Subjective: Pt has been having calf cramping on the L    Pain: 10/10- R quad      Objective: See table below      Assessment: Pt has some discomfort with standing knee flexion against doorway today. Mostly along the path of the HS B. She showed to have decreased flexibility in the L gastroc, but did improve after using the massage stick. Antalgic gait upon arrival today      Goals:   Patient to be independent with HEP  Patient to increase R hip strength=L to allow patient to ascend descend stairs comfortably at home  Patient to increase R hip AROM=WFL to allow patient to roll in bed without increases in pain  Patient to be able to stand for 15 minutes without c/o increases in pain >1/10  Patient to be able to walk for >15 minutes without c/o increases in pain.    Plan: Cont POC  Date: 12/10/2024  TX#: 2/10 Date: 12/17/24                TX#: 3/10 Date:   1/7/24              TX#: 4/10 Date: 1/9/25                TX#: 5/10 Date: 1/1/6/25  Tx#: 6/10   Manual:  PROM hip all planes Manual:  PROM hip all planes  Gentle STM over clothing lateral hip  Prone quad stretch x 1 min B Manual:  PROM hip all planes  Gentle STM over clothing lateral hip  Prone quad stretch x 1 min B TherEx:  NuStep x7 min  Standing march 2x10  Standing 2# abd 2x10  Standing 2# ext 2x10  Standing HS curl 2# 2x10  Sitting LAQ 2# 3x10 B  Seated IR ER 2x10  QS long sitting x20  Review HEP TherEx:  NuStep x 7min  Massage stick to the L calf   R hip ext prone x10  Calf stretch on incline 30 sec x 4  Prone quad stretch 30 sec x 3B  Heel raises x10  HS at stair 30 sec x 2 B  Quad against door frame knee  flexion x15B  March 2x10   TherEx:  SAQ 2x10  Bridge x5, x2  LAQ x15B  Stand on airex:  Hip abd x10 B - dc after about 8 reps on the L because increased R hip pain  Standing on firm surface:  Hip ext B x10  Calf stretch on incline 30 sec x2   TherEx:  SL clams 2x10 B  Prone knee flexion 2x10 B  LAQ 2# 3x10 B  March 2# 2x10B  Hip Abd to tolerance 2 # x10 B    TherEx:   Bent knee fall out 10 sec hold x5 B   Hooklying ball squeeze 5 sec hold x15  SAQ 1# x15 B  Attempted SLR- DC due to pain  Prone HS curl 1# x20  LAQ 1# x15 B  Prone quad stretch manual x1-2 min B                    HEP:   Eval:  Exercises  - Hooklying Gluteal Sets  - 1 x daily - 7 x weekly - 1 sets - 15 reps - 5 hold  - Supine Heel Slide  - 1 x daily - 7 x weekly - 1 sets - 10 reps  - Clamshell  - 1 x daily - 7 x weekly - 1 sets - 10 reps  - Supine Lower Trunk Rotation  - 1 x daily - 7 x weekly - 1 sets - 10 reps    1/9/25  Access Code: [email or web address]  URL: [email or web address]  Date: 01/09/2025  Prepared by: Mayra Chamberlain    Exercises  - Supine Bridge  - 1 x daily - 7 x weekly - 2 sets - 10 reps  - Bent Knee Fallouts  - 1 x daily - 7 x weekly - 1 sets - 10 reps  - Clamshell  - 1 x daily - 7 x weekly - 3 sets - 10 reps  - Supine Lower Trunk Rotation  - 1 x daily - 7 x weekly - 3 sets - 10 reps  - Standing Hip Abduction with Counter Support  - 1 x daily - 7 x weekly - 1 sets - 10 reps  - Standing March with Counter Support  - 1 x daily - 7 x weekly - 2 sets - 10 reps  - Long Sitting Quad Set  - 1 x daily - 7 x weekly - 2 sets - 10 reps  - Seated Long Arc Quad  - 1 x daily - 7 x weekly - 3 sets - 10 reps    Charges: 3 TherEx 40min       Total Timed Treatment: 40 min  Total Treatment Time: 40 min

## 2025-01-21 ENCOUNTER — APPOINTMENT (OUTPATIENT)
Dept: PHYSICAL THERAPY | Age: 89
End: 2025-01-21
Payer: MEDICARE

## 2025-01-23 ENCOUNTER — OFFICE VISIT (OUTPATIENT)
Dept: PHYSICAL THERAPY | Age: 89
End: 2025-01-23
Payer: MEDICARE

## 2025-01-23 PROCEDURE — 97110 THERAPEUTIC EXERCISES: CPT | Performed by: PHYSICAL THERAPIST

## 2025-01-23 PROCEDURE — 97140 MANUAL THERAPY 1/> REGIONS: CPT | Performed by: PHYSICAL THERAPIST

## 2025-01-23 NOTE — PROGRESS NOTES
Diagnosis:   Primary osteoarthritis of right hip (M16.11)  Acute midline low back pain without sciatica (M54.50)         Referring Provider: No ref. provider found  Date of Evaluation:    12/4/24    Precautions:  None Next MD visit:   none scheduled  Date of Surgery: n/a   Insurance Primary/Secondary: MEDICARE / COMMERCIAL     # Auth Visits: 7/10            Subjective: Pt feels stiff today.     Pain:       Objective: See table below      Assessment: Pt amb into clinic today with a swing through gait. Increased discomfort today with R hip AROM compared to previous visits. Could be weather related per patient. Patient mostly has pain with IR/ER motions vs. Flex , Ext, Abd. She was able to amb out of the clinic without the use of her SC and with improved gait patterns.      Goals:   Patient to be independent with HEP  Patient to increase R hip strength=L to allow patient to ascend descend stairs comfortably at home  Patient to increase R hip AROM=WFL to allow patient to roll in bed without increases in pain  Patient to be able to stand for 15 minutes without c/o increases in pain >1/10  Patient to be able to walk for >15 minutes without c/o increases in pain.    Plan: Cont POC  Date: 1/9/25                TX#: 5/10 Date: 1/1/6/25  Tx#: 6/10 Date: 1/23/5  Tx: 7/10   TherEx:  NuStep x7 min  Standing march 2x10  Standing 2# abd 2x10  Standing 2# ext 2x10  Standing HS curl 2# 2x10  Sitting LAQ 2# 3x10 B  Seated IR ER 2x10  QS long sitting x20  Review HEP TherEx:  NuStep x 7min  Massage stick to the L calf   R hip ext prone x10  Calf stretch on incline 30 sec x 4  Prone quad stretch 30 sec x 3B  Heel raises x10  HS at stair 30 sec x 2 B  Quad against door frame knee flexion x15B  March 2x10 TherEx:   NuStep x 7min Level 5  Heel slide x10 R  BKFO 2x10  March 3x10  Seated IR and ER x10B  Stand at bar march and kick out to the side into abd x10B  Big step forward holding onto bar x10B  Heel raises x20     Manual:  Roller to  ITB, glut, and quad R             HEP:   Eval:  Exercises  - Hooklying Gluteal Sets  - 1 x daily - 7 x weekly - 1 sets - 15 reps - 5 hold  - Supine Heel Slide  - 1 x daily - 7 x weekly - 1 sets - 10 reps  - Clamshell  - 1 x daily - 7 x weekly - 1 sets - 10 reps  - Supine Lower Trunk Rotation  - 1 x daily - 7 x weekly - 1 sets - 10 reps    1/9/25  Access Code: [email or web address]  URL: [email or web address]  Date: 01/09/2025  Prepared by: Mayra Chamberlain    Exercises  - Supine Bridge  - 1 x daily - 7 x weekly - 2 sets - 10 reps  - Bent Knee Fallouts  - 1 x daily - 7 x weekly - 1 sets - 10 reps  - Clamshell  - 1 x daily - 7 x weekly - 3 sets - 10 reps  - Supine Lower Trunk Rotation  - 1 x daily - 7 x weekly - 3 sets - 10 reps  - Standing Hip Abduction with Counter Support  - 1 x daily - 7 x weekly - 1 sets - 10 reps  - Standing March with Counter Support  - 1 x daily - 7 x weekly - 2 sets - 10 reps  - Long Sitting Quad Set  - 1 x daily - 7 x weekly - 2 sets - 10 reps  - Seated Long Arc Quad  - 1 x daily - 7 x weekly - 3 sets - 10 reps    Charges: 3 TherEx 40min       Total Timed Treatment: 40 min  Total Treatment Time: 40 min

## 2025-01-28 ENCOUNTER — OFFICE VISIT (OUTPATIENT)
Dept: PHYSICAL THERAPY | Age: 89
End: 2025-01-28
Payer: MEDICARE

## 2025-01-28 PROCEDURE — 97110 THERAPEUTIC EXERCISES: CPT | Performed by: PHYSICAL THERAPIST

## 2025-01-28 NOTE — PROGRESS NOTES
Diagnosis:   Primary osteoarthritis of right hip (M16.11)  Acute midline low back pain without sciatica (M54.50)         Referring Provider: No ref. provider found  Date of Evaluation:    12/4/24    Precautions:  None Next MD visit:   none scheduled  Date of Surgery: n/a   Insurance Primary/Secondary: MEDICARE / COMMERCIAL     # Auth Visits: 7/10            Subjective: Pt feels stiff today.     Pain: 0/10      Objective: See table below      Assessment: Pt has increased difficulty with SLR on the R today. She also displayed decreased rom on the R with hip flexion rom in standing. Focused mainly on psoas activation today. Pt fatigued towards the end of the treatment.       Goals:   Patient to be independent with HEP  Patient to increase R hip strength=L to allow patient to ascend descend stairs comfortably at home  Patient to increase R hip AROM=WFL to allow patient to roll in bed without increases in pain  Patient to be able to stand for 15 minutes without c/o increases in pain >1/10  Patient to be able to walk for >15 minutes without c/o increases in pain.    Plan: Cont POC  Date: 1/9/25                TX#: 5/10 Date: 1/1/6/25  Tx#: 6/10 Date: 1/23/5  Tx: 7/10 Date: 1/28/25  Tx: 8/10   TherEx:  NuStep x7 min  Standing march 2x10  Standing 2# abd 2x10  Standing 2# ext 2x10  Standing HS curl 2# 2x10  Sitting LAQ 2# 3x10 B  Seated IR ER 2x10  QS long sitting x20  Review HEP TherEx:  NuStep x 7min  Massage stick to the L calf   R hip ext prone x10  Calf stretch on incline 30 sec x 4  Prone quad stretch 30 sec x 3B  Heel raises x10  HS at stair 30 sec x 2 B  Quad against door frame knee flexion x15B  March 2x10 TherEx:   NuStep x 7min Level 5  Heel slide x10 R  BKFO 2x10  March 3x10  Seated IR and ER x10B  Stand at bar march and kick out to the side into abd x10B  Big step forward holding onto bar x10B  Heel raises x20 TherEx:   NuStep x7min L5  Standing: mini lunge x10B  PNF d2 extension 2x10  Heel raises x30  March to  side kick into abd x10  SLR with QS 2x10  March hooklying 3x10     Manual:  Roller to ITB, glut, and quad R                HEP:   Eval:  Exercises  - Hooklying Gluteal Sets  - 1 x daily - 7 x weekly - 1 sets - 15 reps - 5 hold  - Supine Heel Slide  - 1 x daily - 7 x weekly - 1 sets - 10 reps  - Clamshell  - 1 x daily - 7 x weekly - 1 sets - 10 reps  - Supine Lower Trunk Rotation  - 1 x daily - 7 x weekly - 1 sets - 10 reps    1/9/25  Access Code: [email or web address]  URL: [email or web address]  Date: 01/09/2025  Prepared by: Mayra Chamberlain    Exercises  - Supine Bridge  - 1 x daily - 7 x weekly - 2 sets - 10 reps  - Bent Knee Fallouts  - 1 x daily - 7 x weekly - 1 sets - 10 reps  - Clamshell  - 1 x daily - 7 x weekly - 3 sets - 10 reps  - Supine Lower Trunk Rotation  - 1 x daily - 7 x weekly - 3 sets - 10 reps  - Standing Hip Abduction with Counter Support  - 1 x daily - 7 x weekly - 1 sets - 10 reps  - Standing March with Counter Support  - 1 x daily - 7 x weekly - 2 sets - 10 reps  - Long Sitting Quad Set  - 1 x daily - 7 x weekly - 2 sets - 10 reps  - Seated Long Arc Quad  - 1 x daily - 7 x weekly - 3 sets - 10 reps    1/28/25:  Access Code: [email or web address]  URL: [email or web address]  Date: 01/28/2025  Prepared by: Mayra Chamberlain    Exercises  - Active Straight Leg Raise with Quad Set  - 1 x daily - 7 x weekly - 1-2 sets - 10 reps  - Standing Hip Flexion with Counter Support  - 1 x daily - 7 x weekly - 2 sets - 10 reps  Charges: 3 TherEx 40min       Total Timed Treatment: 40 min  Total Treatment Time: 40 min

## 2025-01-30 ENCOUNTER — OFFICE VISIT (OUTPATIENT)
Dept: PHYSICAL THERAPY | Age: 89
End: 2025-01-30
Payer: MEDICARE

## 2025-01-30 PROCEDURE — 97110 THERAPEUTIC EXERCISES: CPT | Performed by: PHYSICAL THERAPIST

## 2025-01-30 NOTE — PROGRESS NOTES
Diagnosis:   Primary osteoarthritis of right hip (M16.11)  Acute midline low back pain without sciatica (M54.50)         Referring Provider: No ref. provider found  Date of Evaluation:    12/4/24    Precautions:  None Next MD visit:   none scheduled  Date of Surgery: n/a   Insurance Primary/Secondary: MEDICARE / COMMERCIAL     # Auth Visits: 9/10            Subjective: Pt feels stiff today.     Pain: 0/10      Objective: See table below      Assessment: Pt is able to perform SLR today with increased ease on the R. Pt was sore post treatment. She had some difficulty ith 6 in step up R initially but did improve with increased reps.       Goals:   Patient to be independent with HEP  Patient to increase R hip strength=L to allow patient to ascend descend stairs comfortably at home  Patient to increase R hip AROM=WFL to allow patient to roll in bed without increases in pain  Patient to be able to stand for 15 minutes without c/o increases in pain >1/10  Patient to be able to walk for >15 minutes without c/o increases in pain.    Plan: Cont POC  Date: 1/9/25                TX#: 5/10 Date: 1/1/6/25  Tx#: 6/10 Date: 1/23/5  Tx: 7/10 Date: 1/28/25  Tx: 8/10 Date: 1/30/25  Tx: 9/10   TherEx:  NuStep x7 min  Standing march 2x10  Standing 2# abd 2x10  Standing 2# ext 2x10  Standing HS curl 2# 2x10  Sitting LAQ 2# 3x10 B  Seated IR ER 2x10  QS long sitting x20  Review HEP TherEx:  NuStep x 7min  Massage stick to the L calf   R hip ext prone x10  Calf stretch on incline 30 sec x 4  Prone quad stretch 30 sec x 3B  Heel raises x10  HS at stair 30 sec x 2 B  Quad against door frame knee flexion x15B  March 2x10 TherEx:   NuStep x 7min Level 5  Heel slide x10 R  BKFO 2x10  March 3x10  Seated IR and ER x10B  Stand at bar march and kick out to the side into abd x10B  Big step forward holding onto bar x10B  Heel raises x20 TherEx:   NuStep x7min L5  Standing: mini lunge x10B  PNF d2 extension 2x10  Heel raises x30  March to side kick  into abd x10  SLR with QS 2x10  March hooklying 3x10 TherEx:  NuStep x7min L 4  SLR x10B with QS  Hooklying march 3x10  LTR 2x10  6 in step up x10B  6 in lateral step up x10  Calf stretch on incline 30 sec x 4B  Stand on airex hip abd followed by hip ext x15B  HSS at stair 30 sec x 2 B       Manual:  Roller to ITB, glut, and quad R                   HEP:   Eval:  Exercises  - Hooklying Gluteal Sets  - 1 x daily - 7 x weekly - 1 sets - 15 reps - 5 hold  - Supine Heel Slide  - 1 x daily - 7 x weekly - 1 sets - 10 reps  - Clamshell  - 1 x daily - 7 x weekly - 1 sets - 10 reps  - Supine Lower Trunk Rotation  - 1 x daily - 7 x weekly - 1 sets - 10 reps    1/9/25  Access Code: [email or web address]  URL: [email or web address]  Date: 01/09/2025  Prepared by: Mayra Chamberlain    Exercises  - Supine Bridge  - 1 x daily - 7 x weekly - 2 sets - 10 reps  - Bent Knee Fallouts  - 1 x daily - 7 x weekly - 1 sets - 10 reps  - Clamshell  - 1 x daily - 7 x weekly - 3 sets - 10 reps  - Supine Lower Trunk Rotation  - 1 x daily - 7 x weekly - 3 sets - 10 reps  - Standing Hip Abduction with Counter Support  - 1 x daily - 7 x weekly - 1 sets - 10 reps  - Standing March with Counter Support  - 1 x daily - 7 x weekly - 2 sets - 10 reps  - Long Sitting Quad Set  - 1 x daily - 7 x weekly - 2 sets - 10 reps  - Seated Long Arc Quad  - 1 x daily - 7 x weekly - 3 sets - 10 reps    1/28/25:  Access Code: [email or web address]  URL: [email or web address]  Date: 01/28/2025  Prepared by: Mayra Chamberlain    Exercises  - Active Straight Leg Raise with Quad Set  - 1 x daily - 7 x weekly - 1-2 sets - 10 reps  - Standing Hip Flexion with Counter Support  - 1 x daily - 7 x weekly - 2 sets - 10 reps  Charges: 3 TherEx 40min       Total Timed Treatment: 40 min  Total Treatment Time: 40 min

## 2025-02-04 ENCOUNTER — APPOINTMENT (OUTPATIENT)
Dept: PHYSICAL THERAPY | Age: 89
End: 2025-02-04
Payer: MEDICARE

## 2025-02-05 ENCOUNTER — OFFICE VISIT (OUTPATIENT)
Dept: PHYSICAL THERAPY | Age: 89
End: 2025-02-05
Payer: MEDICARE

## 2025-02-05 PROCEDURE — 97110 THERAPEUTIC EXERCISES: CPT | Performed by: PHYSICAL THERAPIST

## 2025-02-05 NOTE — PROGRESS NOTES
Diagnosis:   Primary osteoarthritis of right hip (M16.11)  Acute midline low back pain without sciatica (M54.50)         Referring Provider: No ref. provider found  Date of Evaluation:    12/4/24    Precautions:  None Next MD visit:   none scheduled  Date of Surgery: n/a   Insurance Primary/Secondary: MEDICARE / COMMERCIAL     # Auth Visits: 10/10           Discharge Summary  Pt has attended 10 visits in Physical Therapy.      Subjective: Pt has more pain today. Pt overall has good and bad days with her hip. She has days of extreme stiffness. She feels like she is the same, but unsure if she would've been worse without physical therapy. Pt is considering seeing an orthopedic as well for pain.    Pain: 8/10; woke up this am with more pain.       Objective: See table below    R Hip AROM:  IR seated 10 deg  ER seated 30 deg  Flex: WFL  Abd: WFL  Ext: 15 deg     R Hip Strength:   All planes 4/5    Gait: Today patient walks in with an antalgic gait with her SC. Patient inconsistently will have a painful gait, and typically is weather dependent. However, today patient is unaware why she is having pain.           Assessment: Pt has improved with ROM and strength for her R hip. Given the strength and rom gains, patient still has pain in the hip that hasn't had significant change. Pt required increased time in the morning to get ready because of the pain.       Goals:   Patient to be independent with HEP- Met  Patient to increase R hip strength=L to allow patient to ascend descend stairs comfortably at home - Met  Patient to increase R hip AROM=WFL to allow patient to roll in bed without increases in pain - Met partially (hip ext limited)  Patient to be able to stand for 15 minutes without c/o increases in pain >1/10 -   Patient to be able to walk for >15 minutes without c/o increases in pain. - Met with using a cart in the store    Plan: Cont POC  Date: 1/9/25                TX#: 5/10 Date: 1/1/6/25  Tx#: 6/10 Date:  1/23/5  Tx: 7/10 Date: 1/28/25  Tx: 8/10 Date: 1/30/25  Tx: 9/10 Date: 2/25/25  Tx: 10/10   TherEx:  NuStep x7 min  Standing march 2x10  Standing 2# abd 2x10  Standing 2# ext 2x10  Standing HS curl 2# 2x10  Sitting LAQ 2# 3x10 B  Seated IR ER 2x10  QS long sitting x20  Review HEP TherEx:  NuStep x 7min  Massage stick to the L calf   R hip ext prone x10  Calf stretch on incline 30 sec x 4  Prone quad stretch 30 sec x 3B  Heel raises x10  HS at stair 30 sec x 2 B  Quad against door frame knee flexion x15B  March 2x10 TherEx:   NuStep x 7min Level 5  Heel slide x10 R  BKFO 2x10  March 3x10  Seated IR and ER x10B  Stand at bar march and kick out to the side into abd x10B  Big step forward holding onto bar x10B  Heel raises x20 TherEx:   NuStep x7min L5  Standing: mini lunge x10B  PNF d2 extension 2x10  Heel raises x30  March to side kick into abd x10  SLR with QS 2x10  March hooklying 3x10 TherEx:  NuStep x7min L 4  SLR x10B with QS  Hooklying march 3x10  LTR 2x10  6 in step up x10B  6 in lateral step up x10  Calf stretch on incline 30 sec x 4B  Stand on airex hip abd followed by hip ext x15B  HSS at stair 30 sec x 2 B   TherEx:  Re-assessment completed  Prone Hip ext 2x10 B  Standing Hip ext 2x10B  Standing march 2x10B  Big retro step x15         Manual:  Roller to ITB, glut, and quad R                      HEP:   Eval:  Exercises  - Hooklying Gluteal Sets  - 1 x daily - 7 x weekly - 1 sets - 15 reps - 5 hold  - Supine Heel Slide  - 1 x daily - 7 x weekly - 1 sets - 10 reps  - Clamshell  - 1 x daily - 7 x weekly - 1 sets - 10 reps  - Supine Lower Trunk Rotation  - 1 x daily - 7 x weekly - 1 sets - 10 reps    1/9/25  Access Code: [email or web address]  URL: [email or web address]  Date: 01/09/2025  Prepared by: Mayra Chamberlain    Exercises  - Supine Bridge  - 1 x daily - 7 x weekly - 2 sets - 10 reps  - Bent Knee Fallouts  - 1 x daily - 7 x weekly - 1 sets - 10 reps  - Clamshell  - 1 x daily - 7 x weekly - 3 sets - 10  reps  - Supine Lower Trunk Rotation  - 1 x daily - 7 x weekly - 3 sets - 10 reps  - Standing Hip Abduction with Counter Support  - 1 x daily - 7 x weekly - 1 sets - 10 reps  - Standing March with Counter Support  - 1 x daily - 7 x weekly - 2 sets - 10 reps  - Long Sitting Quad Set  - 1 x daily - 7 x weekly - 2 sets - 10 reps  - Seated Long Arc Quad  - 1 x daily - 7 x weekly - 3 sets - 10 reps    1/28/25:  Access Code: [email or web address]  URL: [email or web address]  Date: 01/28/2025  Prepared by: Mayra Chamberlain    Exercises  - Active Straight Leg Raise with Quad Set  - 1 x daily - 7 x weekly - 1-2 sets - 10 reps  - Standing Hip Flexion with Counter Support  - 1 x daily - 7 x weekly - 2 sets - 10 reps  Charges: 3 TherEx 40min       Total Timed Treatment: 40 min  Total Treatment Time: 40 min    Post LEFS Score  No data recorded       Plan: Follow up with physician regarding pain in the R hip    Patient/Family/Caregiver was advised of these findings, precautions, and treatment options and has agreed to actively participate in planning and for this course of care.    Thank you for your referral. If you have any questions, please contact me at Dept: [phone].    Sincerely,  Electronically signed by therapist: Mayra Chamberlain PT     Physician's certification required:  Yes  Please co-sign or sign and return this letter via fax as soon as possible to [phone].   I certify the need for these services furnished under this plan of treatment and while under my care.    X___________________________________________________ Date____________________    Certification From: 2/5/2025  To:5/6/2025

## 2025-02-11 ENCOUNTER — LAB ENCOUNTER (OUTPATIENT)
Dept: LAB | Age: 89
End: 2025-02-11
Attending: INTERNAL MEDICINE
Payer: MEDICARE

## 2025-02-11 DIAGNOSIS — R73.01 IMPAIRED FASTING BLOOD SUGAR: ICD-10-CM

## 2025-02-11 LAB
ANION GAP SERPL CALC-SCNC: 8 MMOL/L (ref 0–18)
BUN BLD-MCNC: 22 MG/DL (ref 9–23)
BUN/CREAT SERPL: 17.3 (ref 10–20)
CALCIUM BLD-MCNC: 9.3 MG/DL (ref 8.7–10.4)
CHLORIDE SERPL-SCNC: 102 MMOL/L (ref 98–112)
CO2 SERPL-SCNC: 30 MMOL/L (ref 21–32)
CREAT BLD-MCNC: 1.27 MG/DL
EGFRCR SERPLBLD CKD-EPI 2021: 41 ML/MIN/1.73M2 (ref 60–?)
FASTING STATUS PATIENT QL REPORTED: YES
GLUCOSE BLD-MCNC: 136 MG/DL (ref 70–99)
OSMOLALITY SERPL CALC.SUM OF ELEC: 295 MOSM/KG (ref 275–295)
POTASSIUM SERPL-SCNC: 4.5 MMOL/L (ref 3.5–5.1)
SODIUM SERPL-SCNC: 140 MMOL/L (ref 136–145)

## 2025-02-11 PROCEDURE — 36415 COLL VENOUS BLD VENIPUNCTURE: CPT

## 2025-02-11 PROCEDURE — 80048 BASIC METABOLIC PNL TOTAL CA: CPT

## 2025-02-12 ENCOUNTER — TELEPHONE (OUTPATIENT)
Dept: INTERNAL MEDICINE CLINIC | Facility: CLINIC | Age: 89
End: 2025-02-12

## 2025-02-12 DIAGNOSIS — N17.9 ACUTE RENAL FAILURE SUPERIMPOSED ON STAGE 3A CHRONIC KIDNEY DISEASE, UNSPECIFIED ACUTE RENAL FAILURE TYPE (HCC): Primary | ICD-10-CM

## 2025-02-12 DIAGNOSIS — N18.31 ACUTE RENAL FAILURE SUPERIMPOSED ON STAGE 3A CHRONIC KIDNEY DISEASE, UNSPECIFIED ACUTE RENAL FAILURE TYPE (HCC): Primary | ICD-10-CM

## 2025-02-18 ENCOUNTER — OFFICE VISIT (OUTPATIENT)
Dept: INTERNAL MEDICINE CLINIC | Facility: CLINIC | Age: 89
End: 2025-02-18

## 2025-02-18 VITALS
SYSTOLIC BLOOD PRESSURE: 139 MMHG | BODY MASS INDEX: 35.26 KG/M2 | WEIGHT: 182 LBS | HEIGHT: 60.43 IN | DIASTOLIC BLOOD PRESSURE: 83 MMHG | HEART RATE: 88 BPM

## 2025-02-18 DIAGNOSIS — M16.11 PRIMARY OSTEOARTHRITIS OF RIGHT HIP: ICD-10-CM

## 2025-02-18 DIAGNOSIS — J30.2 SEASONAL ALLERGIC RHINITIS, UNSPECIFIED TRIGGER: ICD-10-CM

## 2025-02-18 DIAGNOSIS — R79.89 BLOOD CREATININE INCREASED COMPARED WITH PRIOR MEASUREMENT: Primary | ICD-10-CM

## 2025-02-18 DIAGNOSIS — R73.01 IMPAIRED FASTING BLOOD SUGAR: ICD-10-CM

## 2025-02-18 PROCEDURE — 99213 OFFICE O/P EST LOW 20 MIN: CPT | Performed by: INTERNAL MEDICINE

## 2025-02-18 NOTE — PROGRESS NOTES
Kendra Polanco is a 89 year old female.  Chief Complaint   Patient presents with    Follow - Up     Labs completed on 25.     HPI:    Patient presented today for follow up. She states that she is worried about her blood work and establishing with renal. Thinks did not drink enough water on the day of blood test. Denies any shortness of breath or peripheral edema.    She states that she has been noticing some nose itching and allergy symptoms. Used to take otc meds which she has not taken in a while.    Also c/o R hip pain  worsening. Has completed PT but not much improvement.    Has been taking her metformin, half tab daily.     Current Outpatient Medications   Medication Sig Dispense Refill    metFORMIN 500 MG Oral Tab Take 0.5 tablets (250 mg total) by mouth daily with breakfast. 60 tablet 0    losartan 25 MG Oral Tab Take 1 tablet (25 mg total) by mouth daily. 90 tablet 3    omeprazole 20 MG Oral Capsule Delayed Release Take 1 capsule (20 mg total) by mouth before breakfast. 90 capsule 3    multivitamin Oral Tab Take 1 tablet by mouth daily with breakfast.      Cholecalciferol (VITAMIN D) 2000 units Oral Cap Take 1 capsule (2,000 Units total) by mouth daily.        History reviewed. No pertinent past medical history.   Past Surgical History:   Procedure Laterality Date    Oophorectomy Bilateral     Cyst    Removal gallbladder      Total abdom hysterectomy      Fibromas      Social History:  Social History     Socioeconomic History    Marital status:    Tobacco Use    Smoking status: Former     Current packs/day: 0.00     Types: Cigarettes     Quit date:      Years since quittin.1     Passive exposure: Never    Smokeless tobacco: Never    Tobacco comments:     quit 2 yrs ago   Vaping Use    Vaping status: Never Used   Substance and Sexual Activity    Alcohol use: Yes     Alcohol/week: 0.0 standard drinks of alcohol     Comment: very rarely on social occassion    Drug use: No    Sexual activity:  Never      History reviewed. No pertinent family history.   Allergies[1]     REVIEW OF SYSTEMS:   Review of Systems   Review of Systems   Constitutional: Negative for activity change, appetite change and fever.   HENT: Negative for congestion and voice change.    Respiratory: Negative for cough and shortness of breath.    Cardiovascular: Negative for chest pain.   Gastrointestinal: Negative for abdominal distention, abdominal pain and vomiting.   Genitourinary: Negative for hematuria.   Skin: Negative for wound.   Psychiatric/Behavioral: Negative for behavioral problems.   Wt Readings from Last 5 Encounters:   02/18/25 182 lb (82.6 kg)   11/18/24 180 lb (81.6 kg)   10/21/24 179 lb (81.2 kg)   11/06/23 169 lb 1.6 oz (76.7 kg)   11/30/22 169 lb (76.7 kg)     Body mass index is 35.04 kg/m².      EXAM:   /83 (BP Location: Left arm, Patient Position: Sitting, Cuff Size: large)   Pulse 88   Ht 5' 0.43\" (1.535 m)   Wt 182 lb (82.6 kg)   BMI 35.04 kg/m²   Physical Exam   Constitutional:       Appearance: Normal appearance.   HENT:      Head: Normocephalic.   Eyes:      Conjunctiva/sclera: Conjunctivae normal.   Breast:  Normal bilateral breast exam. No palpable masses or nodules.   No nipples asymmetry or discharge. No skin changes   Cardiovascular:      Rate and Rhythm: Normal rate and regular rhythm.      Heart sounds: Normal heart sounds. No murmur heard.  Pulmonary:      Effort: Pulmonary effort is normal.      Breath sounds: Normal breath sounds. No rhonchi or rales.   Abdominal:      General: Bowel sounds are normal.      Palpations: Abdomen is soft.      Tenderness: There is no abdominal tenderness.   Musculoskeletal:      Cervical back: Neck supple.      Right lower leg: No edema.      Left lower leg: No edema.   Skin:     General: Skin is warm and dry.   Neurological:      General: No focal deficit present.      Mental Status: He is alert and oriented to person, place, and time. Mental status is at  baseline.   Psychiatric:         Mood and Affect: Mood normal.         Behavior: Behavior normal.       ASSESSMENT AND PLAN:     Assessment & Plan  Blood creatinine increased compared with prior measurement  - recheck labs   - increase hydration   - patient will make nephrology appointment if crea still elevated.   Orders:    Basic Metabolic Panel (8) [E]; Future    Impaired fasting blood sugar  - continue metformin   - check [id number]  Orders:    Hemoglobin A1C [E]; Future    Primary osteoarthritis of right hip  - s/p PT  - tylenol as needed  - wants to wait for now to see ortho        Seasonal allergic rhinitis, unspecified trigger  - otc claritin once daily          The patient indicates understanding of these issues and agrees to the plan.      Zaira Johnson MD     This note was created by Dragon voice recognition. Errors in content may be related to improper recognition by the system; efforts to review and correct have been done but errors may still exist. Please be advised the primary purpose of this note is for me to communicate medical care. Standard sentence structure is not always used. Medical terminology and medical abbreviations may be used. There may be grammatical, typographical, and automated fill ins that may have errors missed in proofreading.        [1]   Allergies  Allergen Reactions    Morphine NAUSEA AND VOMITING    Ink RASH

## 2025-02-27 ENCOUNTER — LAB ENCOUNTER (OUTPATIENT)
Dept: LAB | Age: 89
End: 2025-02-27
Attending: INTERNAL MEDICINE
Payer: MEDICARE

## 2025-02-27 DIAGNOSIS — R79.89 BLOOD CREATININE INCREASED COMPARED WITH PRIOR MEASUREMENT: ICD-10-CM

## 2025-02-27 DIAGNOSIS — R73.01 IMPAIRED FASTING BLOOD SUGAR: ICD-10-CM

## 2025-02-27 LAB
ANION GAP SERPL CALC-SCNC: 9 MMOL/L (ref 0–18)
BUN BLD-MCNC: 17 MG/DL (ref 9–23)
BUN/CREAT SERPL: 15 (ref 10–20)
CALCIUM BLD-MCNC: 9.3 MG/DL (ref 8.7–10.4)
CHLORIDE SERPL-SCNC: 101 MMOL/L (ref 98–112)
CO2 SERPL-SCNC: 28 MMOL/L (ref 21–32)
CREAT BLD-MCNC: 1.13 MG/DL
EGFRCR SERPLBLD CKD-EPI 2021: 47 ML/MIN/1.73M2 (ref 60–?)
EST. AVERAGE GLUCOSE BLD GHB EST-MCNC: 140 MG/DL (ref 68–126)
FASTING STATUS PATIENT QL REPORTED: YES
GLUCOSE BLD-MCNC: 131 MG/DL (ref 70–99)
HBA1C MFR BLD: 6.5 % (ref ?–5.7)
OSMOLALITY SERPL CALC.SUM OF ELEC: 289 MOSM/KG (ref 275–295)
POTASSIUM SERPL-SCNC: 4.2 MMOL/L (ref 3.5–5.1)
SODIUM SERPL-SCNC: 138 MMOL/L (ref 136–145)

## 2025-02-27 PROCEDURE — 80048 BASIC METABOLIC PNL TOTAL CA: CPT

## 2025-02-27 PROCEDURE — 36415 COLL VENOUS BLD VENIPUNCTURE: CPT

## 2025-02-27 PROCEDURE — 83036 HEMOGLOBIN GLYCOSYLATED A1C: CPT

## 2025-03-18 DIAGNOSIS — R73.01 IMPAIRED FASTING BLOOD SUGAR: ICD-10-CM

## 2025-06-03 ENCOUNTER — TELEPHONE (OUTPATIENT)
Dept: INTERNAL MEDICINE CLINIC | Facility: CLINIC | Age: 89
End: 2025-06-03

## 2025-06-03 NOTE — TELEPHONE ENCOUNTER
Called patient, no answer. Fax received from surgeon Dr. Hasmukh Lan, patient scheduled for right total hip arthroplasty on 07/18/25. Patient needs medical clearance.    Fax:[phone]

## 2025-07-01 ENCOUNTER — EKG ENCOUNTER (OUTPATIENT)
Dept: LAB | Age: 89
End: 2025-07-01
Attending: ORTHOPAEDIC SURGERY
Payer: MEDICARE

## 2025-07-01 ENCOUNTER — OFFICE VISIT (OUTPATIENT)
Dept: INTERNAL MEDICINE CLINIC | Facility: CLINIC | Age: 89
End: 2025-07-01
Payer: MEDICARE

## 2025-07-01 ENCOUNTER — LAB ENCOUNTER (OUTPATIENT)
Dept: LAB | Age: 89
End: 2025-07-01
Attending: ORTHOPAEDIC SURGERY
Payer: MEDICARE

## 2025-07-01 VITALS
HEART RATE: 76 BPM | SYSTOLIC BLOOD PRESSURE: 131 MMHG | DIASTOLIC BLOOD PRESSURE: 80 MMHG | WEIGHT: 176 LBS | HEIGHT: 60 IN | BODY MASS INDEX: 34.55 KG/M2

## 2025-07-01 DIAGNOSIS — I10 PRIMARY HYPERTENSION: ICD-10-CM

## 2025-07-01 DIAGNOSIS — R73.09 IMPAIRED GLUCOSE TOLERANCE TEST: ICD-10-CM

## 2025-07-01 DIAGNOSIS — Z01.818 PRE-OP EVALUATION: Primary | ICD-10-CM

## 2025-07-01 DIAGNOSIS — M25.551 RIGHT HIP PAIN: Primary | ICD-10-CM

## 2025-07-01 DIAGNOSIS — M16.11 PRIMARY OSTEOARTHRITIS OF RIGHT HIP: ICD-10-CM

## 2025-07-01 DIAGNOSIS — G25.81 RESTLESS LEG SYNDROME: ICD-10-CM

## 2025-07-01 DIAGNOSIS — M79.604 RIGHT LEG PAIN: ICD-10-CM

## 2025-07-01 LAB
ALBUMIN SERPL-MCNC: 4.6 G/DL (ref 3.2–4.8)
ALBUMIN/GLOB SERPL: 1.5 {RATIO} (ref 1–2)
ALP LIVER SERPL-CCNC: 68 U/L (ref 55–142)
ALT SERPL-CCNC: 21 U/L (ref 10–49)
ANION GAP SERPL CALC-SCNC: 8 MMOL/L (ref 0–18)
AST SERPL-CCNC: 23 U/L (ref ?–34)
ATRIAL RATE: 80 BPM
BASOPHILS # BLD AUTO: 0.1 X10(3) UL (ref 0–0.2)
BASOPHILS NFR BLD AUTO: 1 %
BILIRUB SERPL-MCNC: 0.5 MG/DL (ref 0.2–1.1)
BUN BLD-MCNC: 19 MG/DL (ref 9–23)
BUN/CREAT SERPL: 14.8 (ref 10–20)
CALCIUM BLD-MCNC: 9.5 MG/DL (ref 8.7–10.4)
CHLORIDE SERPL-SCNC: 105 MMOL/L (ref 98–112)
CO2 SERPL-SCNC: 25 MMOL/L (ref 21–32)
CREAT BLD-MCNC: 1.28 MG/DL (ref 0.55–1.02)
DEPRECATED RDW RBC AUTO: 44.5 FL (ref 35.1–46.3)
EGFRCR SERPLBLD CKD-EPI 2021: 41 ML/MIN/1.73M2 (ref 60–?)
EOSINOPHIL # BLD AUTO: 0.19 X10(3) UL (ref 0–0.7)
EOSINOPHIL NFR BLD AUTO: 2 %
ERYTHROCYTE [DISTWIDTH] IN BLOOD BY AUTOMATED COUNT: 12.2 % (ref 11–15)
EST. AVERAGE GLUCOSE BLD GHB EST-MCNC: 128 MG/DL (ref 68–126)
FASTING STATUS PATIENT QL REPORTED: YES
GLOBULIN PLAS-MCNC: 3.1 G/DL (ref 2–3.5)
GLUCOSE BLD-MCNC: 127 MG/DL (ref 70–99)
HBA1C MFR BLD: 6.1 % (ref ?–5.7)
HCT VFR BLD AUTO: 38.9 % (ref 35–48)
HGB BLD-MCNC: 12.9 G/DL (ref 12–16)
IMM GRANULOCYTES # BLD AUTO: 0.03 X10(3) UL (ref 0–1)
IMM GRANULOCYTES NFR BLD: 0.3 %
INR BLD: 0.96 (ref 0.8–1.2)
LYMPHOCYTES # BLD AUTO: 1.8 X10(3) UL (ref 1–4)
LYMPHOCYTES NFR BLD AUTO: 18.6 %
MCH RBC QN AUTO: 32.6 PG (ref 26–34)
MCHC RBC AUTO-ENTMCNC: 33.2 G/DL (ref 31–37)
MCV RBC AUTO: 98.2 FL (ref 80–100)
MONOCYTES # BLD AUTO: 0.92 X10(3) UL (ref 0.1–1)
MONOCYTES NFR BLD AUTO: 9.5 %
NEUTROPHILS # BLD AUTO: 6.65 X10 (3) UL (ref 1.5–7.7)
NEUTROPHILS # BLD AUTO: 6.65 X10(3) UL (ref 1.5–7.7)
NEUTROPHILS NFR BLD AUTO: 68.6 %
OSMOLALITY SERPL CALC.SUM OF ELEC: 290 MOSM/KG (ref 275–295)
P-R INTERVAL: 124 MS
PLATELET # BLD AUTO: 273 10(3)UL (ref 150–450)
POTASSIUM SERPL-SCNC: 3.9 MMOL/L (ref 3.5–5.1)
PROT SERPL-MCNC: 7.7 G/DL (ref 5.7–8.2)
PROTHROMBIN TIME: 13.3 SECONDS (ref 11.6–14.8)
Q-T INTERVAL: 370 MS
QRS DURATION: 88 MS
QTC CALCULATION (BEZET): 426 MS
R AXIS: 17 DEGREES
RBC # BLD AUTO: 3.96 X10(6)UL (ref 3.8–5.3)
SODIUM SERPL-SCNC: 138 MMOL/L (ref 136–145)
T AXIS: 71 DEGREES
VENTRICULAR RATE: 80 BPM
WBC # BLD AUTO: 9.7 X10(3) UL (ref 4–11)

## 2025-07-01 PROCEDURE — 80053 COMPREHEN METABOLIC PANEL: CPT

## 2025-07-01 PROCEDURE — 85025 COMPLETE CBC W/AUTO DIFF WBC: CPT

## 2025-07-01 PROCEDURE — 85610 PROTHROMBIN TIME: CPT

## 2025-07-01 PROCEDURE — 93010 ELECTROCARDIOGRAM REPORT: CPT | Performed by: STUDENT IN AN ORGANIZED HEALTH CARE EDUCATION/TRAINING PROGRAM

## 2025-07-01 PROCEDURE — 99214 OFFICE O/P EST MOD 30 MIN: CPT | Performed by: INTERNAL MEDICINE

## 2025-07-01 PROCEDURE — 36415 COLL VENOUS BLD VENIPUNCTURE: CPT

## 2025-07-01 PROCEDURE — 83036 HEMOGLOBIN GLYCOSYLATED A1C: CPT

## 2025-07-01 PROCEDURE — 82985 ASSAY OF GLYCATED PROTEIN: CPT

## 2025-07-01 PROCEDURE — 93005 ELECTROCARDIOGRAM TRACING: CPT

## 2025-07-01 NOTE — PROGRESS NOTES
Kendra Polanco is a 89 year old female.  Chief Complaint   Patient presents with    Pre-Op Exam     Patient scheduled for surgery on 07/18 at [facility] with Dr. Hasmukh Lan for right total hip arthroplasty  Fax: [phone]     HPI:    Patient presented today for   Preop clearance  Has been having worsening restless legs and thinks she might need something for it after surgery. Does not want to start prior to surgery.   R hip pain has also been worsening.   Denies any chest pain, shortness of breath or URI symptoms.       Current Medications[1]   Past Medical History[2]   Past Surgical History[3]   Social History:  Short Social Hx on File[4]   Family History[5]   Allergies[6]     REVIEW OF SYSTEMS:   Review of Systems   Review of Systems   Constitutional: Negative for activity change, appetite change and fever.   HENT: Negative for congestion and voice change.    Respiratory: Negative for cough and shortness of breath.    Cardiovascular: Negative for chest pain.   Gastrointestinal: Negative for abdominal distention, abdominal pain and vomiting.   Genitourinary: Negative for hematuria.   Skin: Negative for wound.   Psychiatric/Behavioral: Negative for behavioral problems.   Wt Readings from Last 5 Encounters:   07/01/25 176 lb (79.8 kg)   02/18/25 182 lb (82.6 kg)   11/18/24 180 lb (81.6 kg)   10/21/24 179 lb (81.2 kg)   11/06/23 169 lb 1.6 oz (76.7 kg)     Body mass index is 34.37 kg/m².      EXAM:   /80   Pulse 76   Ht 5' (1.524 m)   Wt 176 lb (79.8 kg)   BMI 34.37 kg/m²   Physical Exam   Constitutional:       Appearance: Normal appearance.   HENT:      Head: Normocephalic.   Eyes:      Conjunctiva/sclera: Conjunctivae normal.   Breast:  Normal bilateral breast exam. No palpable masses or nodules.   No nipples asymmetry or discharge. No skin changes   Cardiovascular:      Rate and Rhythm: Normal rate and regular rhythm.      Heart sounds: Normal heart sounds. No murmur  heard.  Pulmonary:      Effort: Pulmonary effort is normal.      Breath sounds: Normal breath sounds. No rhonchi or rales.   Abdominal:      General: Bowel sounds are normal.      Palpations: Abdomen is soft.      Tenderness: There is no abdominal tenderness.   Musculoskeletal:      Cervical back: Neck supple.      Right lower leg: No edema.      Left lower leg: No edema.   Skin:     General: Skin is warm and dry.   Neurological:      General: No focal deficit present.      Mental Status: He is alert and oriented to person, place, and time. Mental status is at baseline.   Psychiatric:         Mood and Affect: Mood normal.         Behavior: Behavior normal.       ASSESSMENT AND PLAN:      Assessment & Plan  Pre-op evaluation  - labs completed and pending  - Recommend cardiac eval as well pre op  - avoid NSAIDS 7 days prior to surgery  Orders:    [facility] CARDIOLOGY EXTERNAL    Primary osteoarthritis of right hip  - awaiting KEEGAN       Restless leg syndrome  - continue with exercises as needed  - will consider gabapentin if needed post procedure       Primary hypertension  - well controlled  - on losartan         Await work up for final clearance     Addendum- 7/3/2025  Labs reviewed  Creatinine elevated but close to baseline when compared to previous results. Patient will need to establish with renal, can be done after surgery.   Cardiac clearance pending  Cleared medically with acceptable risk     The patient indicates understanding of these issues and agrees to the plan.      Zaira Johnson MD          [1]   Current Outpatient Medications   Medication Sig Dispense Refill    metFORMIN 500 MG Oral Tab Take 0.5 tablets (250 mg total) by mouth daily. 45 tablet 3    losartan 25 MG Oral Tab Take 1 tablet (25 mg total) by mouth daily. 90 tablet 3    omeprazole 20 MG Oral Capsule Delayed Release Take 1 capsule (20 mg total) by mouth before breakfast. 90 capsule 3    multivitamin Oral Tab Take 1 tablet by  mouth daily with breakfast.      Cholecalciferol (VITAMIN D) 2000 units Oral Cap Take 1 capsule (2,000 Units total) by mouth daily.     [2] History reviewed. No pertinent past medical history.  [3]   Past Surgical History:  Procedure Laterality Date    Oophorectomy Bilateral     Cyst    Removal gallbladder      Total abdom hysterectomy      Fibromas   [4]   Social History  Socioeconomic History    Marital status:    Tobacco Use    Smoking status: Former     Current packs/day: 0.00     Types: Cigarettes     Quit date:      Years since quittin.5     Passive exposure: Never    Smokeless tobacco: Never    Tobacco comments:     quit 2 yrs ago   Vaping Use    Vaping status: Never Used   Substance and Sexual Activity    Alcohol use: Yes     Alcohol/week: 0.0 standard drinks of alcohol     Comment: very rarely on social occassion    Drug use: No    Sexual activity: Never   [5] History reviewed. No pertinent family history.  [6]   Allergies  Allergen Reactions    Morphine NAUSEA AND VOMITING    Ink RASH

## 2025-07-02 LAB — FRUCTOSAMINE: 239 UMOL/L

## 2025-07-03 ENCOUNTER — TELEPHONE (OUTPATIENT)
Dept: INTERNAL MEDICINE CLINIC | Facility: CLINIC | Age: 89
End: 2025-07-03

## 2025-07-03 DIAGNOSIS — N18.31 STAGE 3A CHRONIC KIDNEY DISEASE (HCC): Primary | ICD-10-CM

## 2025-07-09 ENCOUNTER — TELEPHONE (OUTPATIENT)
Dept: INTERNAL MEDICINE CLINIC | Facility: CLINIC | Age: 89
End: 2025-07-09

## 2025-07-09 NOTE — TELEPHONE ENCOUNTER
Given patient's GFR at 41 and age 89 she is high risk for worsening kidney injury with high doses of Celebrex. It would be preferred to use a lower dose, possible 100 mg bid for 5 days if agreeable.

## 2025-07-09 NOTE — TELEPHONE ENCOUNTER
Cathryn calling from Dr Flores office     Patient is to have Right total hip on 7/18/2025    Dr Flores is seeking PCP approval for the patient to take Celebrex 200mg  three  times a day  X 5 days  post-operatively    ( due to abnormal GFR and Creatinine)       Dr Johnson- Do you agree ?    Please reply to pool: EM RN TRIAGE          Staff please FAX providers response to Dr Flores's office at [phone]

## 2025-07-09 NOTE — TELEPHONE ENCOUNTER
Cathryn from Dr. Flores's office advised of 's message.  Cathryn took a verbal order from nurse but is also asking to have this faxed to her in writing that patient may take celebrex 100 mg twice a day for 5 days post surgery.     On site staff can you help the fax number is [phone]

## 2025-07-09 NOTE — TELEPHONE ENCOUNTER
Please advice on below, office is asking for a written note stating patient can take celebrex 100mg twice a day for 5 days prior to surgery ( needs providers signature)

## (undated) NOTE — LETTER
[address], [facility]   Date:   11/30/2022     Name:   Batsheva Wilson    YOB: 1935   MRN:   [id number]       WHERE IS YOUR PAIN NOW? Stone the areas on your body where you feel the described sensations. Use the appropriate symbol. Sandrine Muñoz the areas of radiation. Include all affected areas. Just to complete the picture, please draw in the face. ACHE:  ^ ^ ^   NUMBNESS:  0000   PINS & NEEDLES:  = = = =                              ^ ^ ^                       0000              = = = =                                    ^ ^ ^                       0000            = = = =      BURNING:  XXXX   STABBING: ////                  XXXX                ////                         XXXX          ////     Please stone the line below indicating your degree of pain right now  with 0 being no pain 10 being the worst pain possible.                                          0             1             2              3             4              5              6              7             8             9             10         Patient Signature: